# Patient Record
Sex: MALE | Employment: PART TIME | URBAN - METROPOLITAN AREA
[De-identification: names, ages, dates, MRNs, and addresses within clinical notes are randomized per-mention and may not be internally consistent; named-entity substitution may affect disease eponyms.]

---

## 2017-01-19 ENCOUNTER — OFFICE VISIT (OUTPATIENT)
Dept: PSYCHIATRY | Facility: CLINIC | Age: 22
End: 2017-01-19
Attending: PSYCHIATRY & NEUROLOGY
Payer: COMMERCIAL

## 2017-01-19 VITALS — DIASTOLIC BLOOD PRESSURE: 77 MMHG | WEIGHT: 129.2 LBS | SYSTOLIC BLOOD PRESSURE: 128 MMHG | HEART RATE: 78 BPM

## 2017-01-19 DIAGNOSIS — F90.2 ATTENTION DEFICIT HYPERACTIVITY DISORDER (ADHD), COMBINED TYPE: ICD-10-CM

## 2017-01-19 DIAGNOSIS — N39.44 NOCTURNAL ENURESIS: Primary | ICD-10-CM

## 2017-01-19 PROCEDURE — 99212 OFFICE O/P EST SF 10 MIN: CPT | Mod: ZF

## 2017-01-19 RX ORDER — ATOMOXETINE 18 MG/1
18 CAPSULE ORAL DAILY
Qty: 30 CAPSULE | Refills: 2 | Status: SHIPPED | OUTPATIENT
Start: 2017-01-19 | End: 2017-04-17

## 2017-01-19 RX ORDER — IMIPRAMINE HCL 25 MG
25 TABLET ORAL AT BEDTIME
Qty: 30 TABLET | Refills: 1 | Status: SHIPPED | OUTPATIENT
Start: 2017-01-19 | End: 2017-03-24

## 2017-01-19 NOTE — NURSING NOTE
Chief Complaint   Patient presents with     RECHECK     ADHD,Anxiety Disorder unspecified     Reviewed allergies, smoking status, and pharmacy preference  Administered abuse screening questions   Obtained weight, blood pressure and heart rate   Nara Levin LPN

## 2017-01-19 NOTE — PROGRESS NOTES
"VITALS:  See charted vitals.      HISTORY OF PRESENT ILLNESS:  Isak attends the appointment with his parents.  He was on guanfacine for ADHD, but was experiencing some mild benefits and side effects.  Therefore, his mother advised how to taper off the medication.  He is currently taking 1/3 of a 1-mg tablet at bedtime.  Isak reports that at 1.5 mg bid of Guanfacine he saw benefit for his focus, but he was very tired.  The dose was taken up to 2 mg bid, as recommended by his mother.  At that dose, there was no additional benefit, but more side effects.      Isak started taking imipramine 20 mg per day for enuresis.  It has been beneficial in that he has been dry some nights.  He reports having dry mouth.  He thinks since being on imipramine that he has been a bit more irritable and \"miserable.\"  His mother disagrees stating that he has seemed better from a mood perspective since starting the imipramine.  Isak states he sleeps okay and his eating is fine.  He denies suicidal ideation.  He denies worries.  He has been running a lot, up to several miles about 5 times a week.  His mother wonders if he is running too much and is getting fatigued.      Isak and family would like to try something else for ADHD.  We reviewed the gene testing and decided to go with Strattera.      CURRENT MEDICATION:  Guanfacine .33 mg at bedtime.      SIDE EFFECTS:  Had sedation on higher dose of guanfacine.      MENTAL STATUS EXAMINATION:  Isak is a thin, Malag[place] male of average height.  He is cooperative, friendly, and interactive.  His mood has been neutral with some irritability.  Affect is full-range and appropriate.  Thinking is linear and goal-directed.  Associations are intact.  No psychosis.  No suicidal ideation.  Attention, concentration, language, and fund of knowledge are all within normal limits.  No speech or motor abnormalities.  Insight and judgment are fair to good.      DIAGNOSES:   1. ADHD.   2. History of " mood disorder NEC.   3. Nocturnal enuresis.      RECOMMENDATIONS:   1. Risks and benefits of starting Strattera were discussed with Isak and his family.  They agreed to the medication trial.  We will start with Strattera 18 mg per day and titrate up if needed.   2. Increase imipramine to 25 mg per day.   3. Call prn.   4. Return to clinic in 3 months.      TOTAL TIME SPENT:  Face-to-face with Isak and his parents was 35 minutes with greater than 50% of the time spent in counseling and coordination of care.  Counseling included evaluation of recent medication trials, assessment of current symptoms and functioning, and recommendations to initiate Strattera.               geo

## 2017-03-24 DIAGNOSIS — N39.44 NOCTURNAL ENURESIS: ICD-10-CM

## 2017-03-24 RX ORDER — IMIPRAMINE HCL 25 MG
25 TABLET ORAL AT BEDTIME
Qty: 25 TABLET | Refills: 0 | Status: SHIPPED | OUTPATIENT
Start: 2017-03-24 | End: 2017-04-17

## 2017-03-24 NOTE — TELEPHONE ENCOUNTER
Medication Requested: Imipramine  Last Written RX Date: 1/19/17  Last Pharmacy Fill Date: 2/20/17  Last RX Quantity: 30,   # refills: 1    Last Office Visit: 1/19/17  Recommended RTC: 3 mo  Next Scheduled Office Visit: 4/17/17    Since last Visit: # of CX 0 ,# of NOS 0    Last Visit Recommendations for:  Medications: dose increase as ordered  Labs:  Not noted

## 2017-04-17 ENCOUNTER — OFFICE VISIT (OUTPATIENT)
Dept: PSYCHIATRY | Facility: CLINIC | Age: 22
End: 2017-04-17
Attending: PSYCHIATRY & NEUROLOGY
Payer: COMMERCIAL

## 2017-04-17 VITALS — DIASTOLIC BLOOD PRESSURE: 56 MMHG | HEART RATE: 77 BPM | WEIGHT: 132 LBS | SYSTOLIC BLOOD PRESSURE: 115 MMHG

## 2017-04-17 DIAGNOSIS — F90.0 ADHD (ATTENTION DEFICIT HYPERACTIVITY DISORDER), INATTENTIVE TYPE: Primary | ICD-10-CM

## 2017-04-17 DIAGNOSIS — N39.44 NOCTURNAL ENURESIS: ICD-10-CM

## 2017-04-17 PROCEDURE — 99212 OFFICE O/P EST SF 10 MIN: CPT | Mod: ZF

## 2017-04-17 RX ORDER — ATOMOXETINE 40 MG/1
40 CAPSULE ORAL DAILY
Qty: 30 CAPSULE | Refills: 3 | Status: SHIPPED | OUTPATIENT
Start: 2017-04-17 | End: 2017-08-22

## 2017-04-17 RX ORDER — IMIPRAMINE HYDROCHLORIDE 10 MG/1
30 TABLET, FILM COATED ORAL AT BEDTIME
Qty: 90 TABLET | Refills: 3 | Status: SHIPPED | OUTPATIENT
Start: 2017-04-17 | End: 2017-08-22

## 2017-04-17 NOTE — MR AVS SNAPSHOT
After Visit Summary   4/17/2017    Isak Drummond    MRN: [id number]           Patient Information     Date Of Birth          1995        Visit Information        Provider Department      4/17/2017 1:00 PM Mary Sorenson MD [facility]iat[facility]        Today's Diagnoses     ADHD (attention deficit hyperactivity disorder), inattentive type    -  1    Nocturnal enuresis          Care Instructions    Thank you for coming to the PSYCHIATRY CLINIC.    Lab Testing:  **If you had lab testing today and your results are reassuring or normal they will be mailed to you or sent through avolution within 7 days.   **If the lab tests need quick action we will call you with the results.  The phone number we will call with results is # [phone] (home) . If this is not the best number please call our clinic and change the number.    Medication Refills:  If you need any refills please call your pharmacy and they will contact us. Please allow three business for refill processing.   If you need to  your refill at a new pharmacy, please contact the new pharmacy directly. The new pharmacy will help you get your medications transferred.     Scheduling:  If you have any concerns about today's visit or wish to schedule another appointment please call our office during normal business hours [phone] (8-5:00 M-F)    Contact Us:  Please call [phone] during business hours (8-5:00 M-F).  If after clinic hours, or on the weekend, please call  [phone].      MENTAL HEALTH CRISIS NUMBERS:  [facility]:   [facility] - [phone]   [facility] Res[facility] - [phone]   Walk-In Counseling [facility] [phone]   COPE 24/7 [place] Mobile Team for Adults - [[phone]]; Child - [[phone]]     Crisis Connection - [phone]     [facility]:   [facility] - [phone]   Walk-in counseling [facility] - [phone]    Walk-in counseling [facility] - [phone]   Crisis Residence  [facility] - [phone]   Urgent Care Adult Mental Health:   --Drop-in, 24/7 crisis line, and [facility] Mobile Team [[phone]]    CRISIS TEXT LINE: Text [phone] from anywhere, anytime, any crisis 24/7;    OR SEE www.crisistextline.org     Poison Control Center - [phone]    CHILD: [facility] needs assessment team - [phone]     Trans Lifeline - [phone]; or [facility]tee Lifeline - 1-950.457.3412    If you have a medical emergency please call 911or go to the nearest ER.                    _____________________________________________    Again thank you for choosing PSYCHIATRY CLINIC and please let us know how we can best partner with you to improve you and your family's health.  You may be receiving a survey in the mail regarding this appointment. We would love to have your feedback, both positive and negative, so please fill out the survey and return it using the provided envolpe. The survey is done by an external company, so your answers are anonymous.           Follow-ups after your visit        Your next 10 appointments already scheduled     Aug 31, 2017 12:45 PM CDT   Child Anxiety Follow Up with Mary Sorenson MD   Psychiatry Clinic ([facility])    [address]  [address] [facility] [id number] [phone]              Who to contact     Please call your clinic at [phone] to:    Ask questions about your health    Make or cancel appointments    Discuss your medicines    Learn about your test results    Speak to your doctor   If you have compliments or concerns about an experience at your clinic, or if you wish to file a complaint, please contact [facility] Patient Relations at [phone] or email us at [email or web address]         Additional Information About Your Visit        Darshan  Information     [employer] is an electronic gateway that provides easy, online access to your medical records. With [employer], you can request a clinic appointment, read your test results, renew a prescription or communicate with your care team.     To sign up for [employer] visit the website at [email or web address]   You will be asked to enter the access code listed below, as well as some personal information. Please follow the directions to create your username and password.     Your access code is: [email or web address]  Expires: 2017  1:49 PM     Your access code will  in 90 days. If you need help or a new code, please contact your [facility] or call [phone] for assistance.        Care EveryWhere ID     This is your Care EveryWhere ID. This could be used by other organizations to access your [facility] medical records  [id number]         Blood Pressure from Last 3 Encounters:   17 128/77   16 127/64   16 100/55    Weight from Last 3 Encounters:   17 58.6 kg (129 lb 3.2 oz)   16 57.7 kg (127 lb 3.2 oz)   16 56.2 kg (124 lb)              Today, you had the following     No orders found for display         Today's Medication Changes          These changes are accurate as of: 17  1:49 PM.  If you have any questions, ask your nurse or doctor.               These medicines have changed or have updated prescriptions.        Dose/Directions    atomoxetine 40 MG capsule   Commonly known as:  STRATTERA   This may have changed:    - medication strength  - how much to take   Used for:  ADHD (attention deficit hyperactivity disorder), inattentive type        Dose:  40 mg   Take 1 capsule (40 mg) by mouth daily   Quantity:  30 capsule   Refills:  3       imipramine 10 MG tablet   Commonly known as:  TOFRANIL   This may have changed:    - how much to take  - how to take this  - when to take this  - additional instructions  - Another medication with  the same name was removed. Continue taking this medication, and follow the directions you see here.   Used for:  Nocturnal enuresis        Dose:  30 mg   Take 3 tablets (30 mg) by mouth At Bedtime   Quantity:  90 tablet   Refills:  3         Stop taking these medicines if you haven't already. Please contact your care team if you have questions.     guanFACINE 1 MG tablet   Commonly known as:  TENEX                Where to get your medicines      These medications were sent to [id number] - [place], [place] - [address] AT [facility] [address]  [address], [place] [address]    Hours:  24-hours Phone:  [phone]     atomoxetine 40 MG capsule    imipramine 10 MG tablet                Primary Care Provider    Physician No Ref-Primary       No address on file        Thank you!     Thank you for choosing PSYCHIATRY CLINIC  for your care. Our goal is always to provide you with excellent care. Hearing back from our patients is one way we can continue to improve our services. Please take a few minutes to complete the written survey that you may receive in the mail after your visit with us. Thank you!             Your Updated Medication List - Protect others around you: Learn how to safely use, store and throw away your medicines at www.disposemymeds.org.          This list is accurate as of: 4/17/17  1:49 PM.  Always use your most recent med list.                   Brand Name Dispense Instructions for use    atomoxetine 40 MG capsule    STRATTERA    30 capsule    Take 1 capsule (40 mg) by mouth daily       imipramine 10 MG tablet    TOFRANIL    90 tablet    Take 3 tablets (30 mg) by mouth At Bedtime

## 2017-04-17 NOTE — PATIENT INSTRUCTIONS
Thank you for coming to the PSYCHIATRY CLINIC.    Lab Testing:  **If you had lab testing today and your results are reassuring or normal they will be mailed to you or sent through [employer] within 7 days.   **If the lab tests need quick action we will call you with the results.  The phone number we will call with results is # [phone] (home) . If this is not the best number please call our clinic and change the number.    Medication Refills:  If you need any refills please call your pharmacy and they will contact us. Please allow three business for refill processing.   If you need to  your refill at a new pharmacy, please contact the new pharmacy directly. The new pharmacy will help you get your medications transferred.     Scheduling:  If you have any concerns about today's visit or wish to schedule another appointment please call our office during normal business hours [phone] (8-5:00 M-F)    Contact Us:  Please call [phone] during business hours (8-5:00 M-F).  If after clinic hours, or on the weekend, please call  [phone].      MENTAL HEALTH CRISIS NUMBERS:  [facility] [place]:   [facility] - [phone]   Crisis Residence [facility] Residence - [phone]   Walk-In Counseling [facility] [phone]   COPE 24/7 [place] Mobile Team for Adults - [[phone]]; Child - [[phone]]     Crisis Connection - [phone]     [facility] - [phone]   Walk-in counseling [facility] - [phone]   Walk-in counseling [facility] - [phone]   Crisis Residence [facility] - [phone]   Urgent Care Adult Mental Health:   --Drop-in, 24/7 crisis line, and [facility] Mobile Team [[phone]]    CRISIS TEXT LINE: Text [phone] from anywhere, anytime, any crisis 24/7;    OR SEE www.crisistextline.org     Poison Control Center - [phone]    CHILD: [facility] needs  assessment team - [phone]     [facility] - [phone]; or [facility] - [phone]    If you have a medical emergency please call 911or go to the nearest ER.                    _____________________________________________    Again thank you for choosing PSYCHIATRY CLINIC and please let us know how we can best partner with you to improve you and your family's health.  You may be receiving a survey in the mail regarding this appointment. We would love to have your feedback, both positive and negative, so please fill out the survey and return it using the provided envolpe. The survey is done by an external company, so your answers are anonymous.

## 2017-04-17 NOTE — PROGRESS NOTES
VITALS:  See charted vitals.      HISTORY OF PRESENT ILLNESS:  Isak attends the appointment with his mother.  He is at [employer] taking accounting and business classes.  It has been a challenging semester due to the  Heavy work load.  At the last appointment he started Strattera 18 mg per day.  Isak has noticed some benefit.  It has allowed him to focus and be less distracted.  The improvements are subtle.  He denies any side effects.      His mood has been pretty good.  He is exercising a lot, including running about 20 miles a week and he started swimming.  He has become quite strong.  His mood has been mostly good and Isak has been eating and sleeping pretty well.      With Imipramine 25 mg per day, enuresis has improved to 1-2 times per week.    Before it was 4-5 times per week.  As a side effect of Imipramine Isak is reporting a dry mouth.  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  Thus, his family would like to try a higher dose of Imipramine to see if it will further improve the enuresis.      CURRENT MEDICATIONS:   1.  Strattera 18 mg per day.   2.  Imipramine 25 mg at bedtime.      SIDE EFFECTS:  Dry mouth from Imipramine.      MENTAL STATUS EXAMINATION:  Isak is casually dressed and groomed.  He maintains good eye contact.  He answers questions intelligently.  His mood is neutral.  Affect is full range and appropriate.  Thinking is linear and goal-directed, associations are intact.  There is no psychosis.  There is no evidence of suicidal ideation.  Recent and remote memory, attention, concentration, language and fund of knowledge are all within normal limits.  No speech or motor abnormalities.  Insight and judgment are good.      DIAGNOSES:   1.  ADHD, predominantly inattentive.   2.  History of mood disorder NOS.   3.  Nocturnal enuresis.      RECOMMENDATIONS:   1.  Increase Strattera to 40 mg per day which is still a low dose for him.  We reviewed the [employer] testing  which indicates that Strattera should be metabolized normally by this individual.   2.  Increase Imipramine from 25 to 30 mg at bedtime.   3.  Call with progress report if side effects are noted or a change in dose is requested.   4.  Return to clinic in August.      TOTAL TIME SPENT:  Face-to-face with Isak and his mother was 35 minutes with greater than 50% of the time spent in counseling and coordination of care.  Counseling included assessment of symptoms and functioning, discussion of adjustment to the academic challenges and plans to do an internship this summer.

## 2017-04-17 NOTE — NURSING NOTE
Chief Complaint   Patient presents with     RECHECK     ADHD     Reviewed allergies, smoking status, and pharmacy preference  Administered abuse screening questions-done 1/19/17   Obtained weight-132.0 lb, blood pressure-115/56 and heart rate-77   Nara Levin LPN

## 2017-08-22 DIAGNOSIS — N39.44 NOCTURNAL ENURESIS: ICD-10-CM

## 2017-08-22 DIAGNOSIS — F90.0 ADHD (ATTENTION DEFICIT HYPERACTIVITY DISORDER), INATTENTIVE TYPE: ICD-10-CM

## 2017-08-22 RX ORDER — IMIPRAMINE HYDROCHLORIDE 10 MG/1
30 TABLET, FILM COATED ORAL AT BEDTIME
Qty: 90 TABLET | Refills: 0 | Status: SHIPPED | OUTPATIENT
Start: 2017-08-22 | End: 2017-08-31

## 2017-08-22 RX ORDER — ATOMOXETINE 40 MG/1
40 CAPSULE ORAL DAILY
Qty: 30 CAPSULE | Refills: 0 | Status: SHIPPED | OUTPATIENT
Start: 2017-08-22 | End: 2017-08-31

## 2017-08-22 NOTE — TELEPHONE ENCOUNTER
Medication requested: Strattera and Tofranil  Last refilled: 7-16-17  Qty: 30 day supply      Last seen: 4-17-17  RTC: august  Cancel: 0  No-show: 0  Next appt: 8-31-17    Refill decision: Refilled for 30 days per protocol.      Kathleen M Doege RN

## 2017-08-31 ENCOUNTER — OFFICE VISIT (OUTPATIENT)
Dept: PSYCHIATRY | Facility: CLINIC | Age: 22
End: 2017-08-31
Attending: PSYCHIATRY & NEUROLOGY
Payer: COMMERCIAL

## 2017-08-31 VITALS — WEIGHT: 129.4 LBS | DIASTOLIC BLOOD PRESSURE: 81 MMHG | HEART RATE: 74 BPM | SYSTOLIC BLOOD PRESSURE: 123 MMHG

## 2017-08-31 DIAGNOSIS — N39.44 NOCTURNAL ENURESIS: ICD-10-CM

## 2017-08-31 DIAGNOSIS — F90.2 ATTENTION DEFICIT HYPERACTIVITY DISORDER (ADHD), COMBINED TYPE: Primary | ICD-10-CM

## 2017-08-31 DIAGNOSIS — F90.0 ADHD (ATTENTION DEFICIT HYPERACTIVITY DISORDER), INATTENTIVE TYPE: ICD-10-CM

## 2017-08-31 PROCEDURE — 99212 OFFICE O/P EST SF 10 MIN: CPT | Mod: ZF

## 2017-08-31 RX ORDER — ATOMOXETINE 60 MG/1
60 CAPSULE ORAL DAILY
Qty: 30 CAPSULE | Refills: 2 | Status: SHIPPED | OUTPATIENT
Start: 2017-08-31 | End: 2017-11-30

## 2017-08-31 RX ORDER — IMIPRAMINE HYDROCHLORIDE 10 MG/1
30 TABLET, FILM COATED ORAL AT BEDTIME
Qty: 90 TABLET | Refills: 1 | Status: SHIPPED | OUTPATIENT
Start: 2017-09-22 | End: 2017-11-20

## 2017-08-31 NOTE — PROGRESS NOTES
Vital signs: See charted vitals    History of present illness: Isak attends the appointment with his parents. At the last appointment his Strattera was increased to the current dose of 40 mg per day. Patient states that he has found benefit for his ADHD symptoms with Strattera. He is able to focus better throughout the day. There have been no side effects. 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 It was hard work but he learned a lot and it went fine.    He will have a demanding semester including taking a finance class and computer science. His father notes that Isak does better in the real world then in the academic setting. I suggested an increase of Strattera because his dose is low and he is seeing benefit. This may facilitate even better focus and concentration. The patient and his parents agree with this recommendation.    At the last visit imipramine was increased was from 25 mg per day to 30 mg per day. The patient continues to have nocturnal enuresis about 1-2 times per week. At this time he is not interested in increasing the imipramine further. This can be revisited at a later appointment.    His mood has been fine. There is no insomnia. Appetite is very good. Energy is normal. There is no suicidal ideation. He runs 3-5 times per week. Concentration is improved. He is optimistic about the upcoming semester. However he does have worries about meeting deadlines.    CURRENT MEDICATIONS:   1.  Strattera 40 mg per day.   2.  Imipramine 30 mg at bedtime.       SIDE EFFECTS:  Dry mouth from Imipramine.       MENTAL STATUS EXAMINATION:  Isak is casually dressed and groomed.  He maintains good eye contact.  He answers questions intelligently.  His mood is neutral.  Affect is full range and appropriate.  Thinking is linear and goal-directed, associations are intact.  There is no psychosis.  There is no suicidal ideation.  Recent and remote memory, attention, concentration, language and fund  of knowledge are all within normal limits.  No speech or motor abnormalities.  Insight and judgment are good.       DIAGNOSES:   1.  ADHD, predominantly inattentive.   2.  History of mood disorder NOS.   3.  Nocturnal enuresis.       RECOMMENDATIONS:   1.  Increase Strattera to 60 mg per day which is close to a target dose for him.  Previous review of the GenePsych testing indicated that Strattera should be metabolized normally by this individual.   2.  Continue Imipramine 30 mg at bedtime.   3.  Call with progress report if side effects are noted or a change in dose is requested.   4.  Return to clinic in 6 months and earlier if needed.       TOTAL TIME SPENT:  Face-to-face with Isak and his mother was 30 minutes with greater than 50% of the time spent in counseling and coordination of care.  Counseling included assessment of symptoms and functioning, discussion of nternship this summer and upcoming semester at [employer].

## 2017-08-31 NOTE — MR AVS SNAPSHOT
After Visit Summary   2017    Isak Drummond    MRN: [id number]           Patient Information     Date Of Birth          1995        Visit Information        Provider Department      2017 12:45 PM Mary Sorenson MD [facility]        Today's Diagnoses     Attention deficit hyperactivity disorder (ADHD), combined type    -  1    ADHD (attention deficit hyperactivity disorder), inattentive type        Nocturnal enuresis           Follow-ups after your visit        Follow-up notes from your care team     Return in about 6 months (around 2018).      Who to contact     Please call your clinic at [phone] to:    Ask questions about your health    Make or cancel appointments    Discuss your medicines    Learn about your test results    Speak to your doctor   If you have compliments or concerns about an experience at your clinic, or if you wish to file a complaint, please contact [facility] Patient Relations at [phone] or email us at [email or web address]         Additional Information About Your Visit        MyChart Information     [employer] is an electronic gateway that provides easy, online access to your medical records. With [employer], you can request a clinic appointment, read your test results, renew a prescription or communicate with your care team.     To sign up for [employer] visit the website at [email or web address]   You will be asked to enter the access code listed below, as well as some personal information. Please follow the directions to create your username and password.     Your access code is: [email or web address]  Expires: 2017  2:53 PM     Your access code will  in 90 days. If you need help or a new code, please contact your [facility] or call [phone] for assistance.        Care EveryWhere ID     This is your Care EveryWhere ID. This could be used by other organizations to  access your [facility] medical records  [id number]        Your Vitals Were     Pulse                   74            Blood Pressure from Last 3 Encounters:   08/31/17 123/81   04/17/17 115/56   01/19/17 128/77    Weight from Last 3 Encounters:   08/31/17 58.7 kg (129 lb 6.4 oz)   04/17/17 59.9 kg (132 lb)   01/19/17 58.6 kg (129 lb 3.2 oz)              Today, you had the following     No orders found for display         Today's Medication Changes          These changes are accurate as of: 8/31/17  2:53 PM.  If you have any questions, ask your nurse or doctor.               These medicines have changed or have updated prescriptions.        Dose/Directions    atomoxetine 60 MG capsule   Commonly known as:  STRATTERA   This may have changed:    - medication strength  - how much to take   Used for:  ADHD (attention deficit hyperactivity disorder), inattentive type   Changed by:  Mary Sorenson MD        Dose:  60 mg   Take 1 capsule (60 mg) by mouth daily   Quantity:  30 capsule   Refills:  2            Where to get your medicines      These medications were sent to [facility] - [place], [place] - [address] AT [facility] [address]  [address], [facility] [address]    Hours:  24-hours Phone:  [phone]     atomoxetine 60 MG capsule    imipramine 10 MG tablet                Primary Care Provider    Physician No Ref-Primary       No address on file        Equal Access to Services     DAO BURT AH: Hadii fany sherwood hadjackio Sodell, waaxda luqadaha, qaybta kaalmada jose luisyada, evelio callahan. So [facility] [phone].    ATENCIÓN: Si habla español, tiene a busby disposición servicios gratuitos de asistencia lingüística. Dusty al [phone].    We comply with applicable federal civil rights laws and [place] laws. We do not discriminate on the basis of race, color, national origin, age, disability sex, sexual orientation or gender identity.            Thank you!     Thank you  for choosing [facility]  for your care. Our goal is always to provide you with excellent care. Hearing back from our patients is one way we can continue to improve our services. Please take a few minutes to complete the written survey that you may receive in the mail after your visit with us. Thank you!             Your Updated Medication List - Protect others around you: Learn how to safely use, store and throw away your medicines at www.disposemymeds.org.          This list is accurate as of: 8/31/17  2:53 PM.  Always use your most recent med list.                   Brand Name Dispense Instructions for use Diagnosis    atomoxetine 60 MG capsule    STRATTERA    30 capsule    Take 1 capsule (60 mg) by mouth daily    ADHD (attention deficit hyperactivity disorder), inattentive type       imipramine 10 MG tablet   Start taking on:  9/22/2017    TOFRANIL    90 tablet    Take 3 tablets (30 mg) by mouth At Bedtime    Nocturnal enuresis

## 2017-11-20 DIAGNOSIS — N39.44 NOCTURNAL ENURESIS: ICD-10-CM

## 2017-11-20 RX ORDER — IMIPRAMINE HYDROCHLORIDE 10 MG/1
30 TABLET, FILM COATED ORAL AT BEDTIME
Qty: 90 TABLET | Refills: 0 | Status: SHIPPED | OUTPATIENT
Start: 2017-11-20 | End: 2017-12-04

## 2017-11-20 NOTE — TELEPHONE ENCOUNTER
Medication requested: Imipramine 10 mg tabs  Last refilled: 10-20-17  Qty: 90      Last seen: 8-31-17  RTC: 6 mo  Cancel: 0  No-show: 0  Next appt: none    Refill decision: 30 day randal refill due to no scheduled appointment sent to the pharmacy - including instructions for patient to call the clinic and schedule an appointment.  Scheduling notified.      Kathleen M Doege RN

## 2017-11-30 DIAGNOSIS — F90.0 ADHD (ATTENTION DEFICIT HYPERACTIVITY DISORDER), INATTENTIVE TYPE: ICD-10-CM

## 2017-11-30 RX ORDER — ATOMOXETINE 60 MG/1
60 CAPSULE ORAL DAILY
Qty: 30 CAPSULE | Refills: 0 | Status: SHIPPED | OUTPATIENT
Start: 2017-11-30 | End: 2017-12-04

## 2017-11-30 NOTE — TELEPHONE ENCOUNTER
Medication requested: Atomoxetine 60 mg tabs  Last refilled: 10-27-17  Qty: 30      Last seen: 8-31-17  RTC: 6 mo  Cancel: 0  No-show: 0  Next appt: none    Refill decision: 30 day randal refill sent to the pharmacy - including instructions for patient to call the clinic and schedule an appointment.  Scheduling has been notified to contact the pt for appointment.      Kathleen M Doege RN

## 2017-12-01 ENCOUNTER — TELEPHONE (OUTPATIENT)
Dept: PSYCHIATRY | Facility: CLINIC | Age: 22
End: 2017-12-01

## 2017-12-01 DIAGNOSIS — N39.44 NOCTURNAL ENURESIS: ICD-10-CM

## 2017-12-01 DIAGNOSIS — F90.0 ADHD (ATTENTION DEFICIT HYPERACTIVITY DISORDER), INATTENTIVE TYPE: ICD-10-CM

## 2017-12-01 NOTE — TELEPHONE ENCOUNTER
----- Message from Randell Dumont sent at 12/1/2017 12:55 PM CST -----  Regarding: Conversation w/ pt's mother  Contact: [phone]  I called pt's number to schedule for an appointment in February (per med refill team).  She said that the patient does not need to come back until spring, so they do not want to schedule an appointment at this time.  I explained that when we get messages to schedule from the refill team that means that they would like the patient to have a future appointment scheduled when filling prescriptions.  She was insistent that they did not need to schedule but requested that I send you a message regarding pt's Strattera, and said that she needs to pick it up tonight.

## 2017-12-01 NOTE — TELEPHONE ENCOUNTER
Last seen: 8/31/17  RTC: 6 months    Will notify Dr. Sorenson of msg from pt's mother to see if she would be agreeable to pt returning to clinic in the spring.  Refill of Strattera was approved by refill team yesterday for 30 d/s.

## 2017-12-04 RX ORDER — ATOMOXETINE 60 MG/1
60 CAPSULE ORAL DAILY
Qty: 30 CAPSULE | Refills: 3 | Status: SHIPPED | OUTPATIENT
Start: 2017-12-04 | End: 2018-03-28

## 2017-12-04 RX ORDER — IMIPRAMINE HYDROCHLORIDE 10 MG/1
30 TABLET, FILM COATED ORAL AT BEDTIME
Qty: 90 TABLET | Refills: 3 | Status: SHIPPED | OUTPATIENT
Start: 2017-12-04 | End: 2018-03-28

## 2017-12-04 NOTE — TELEPHONE ENCOUNTER
-Call placed to mom Sema (consent to communicate on file)  -Reports pt is doing well and assisted in scheduling an appt for 3/28/18 at 1:00 pm while pt is on spring break  -Shared that writer would send enough refills to pharmacy to get pt through until appt in March  -Mom confirms she picked up refills of both meds in late Nov.

## 2017-12-04 NOTE — TELEPHONE ENCOUNTER
Message  Received: 3 days ago       Mary Sorenson MD Blake, Katherine J RN       Caller: Unspecified (3 days ago,  2:29 PM)                     Yes.  GB

## 2018-03-26 ENCOUNTER — THERAPY VISIT (OUTPATIENT)
Dept: CHIROPRACTIC MEDICINE | Facility: CLINIC | Age: 23
End: 2018-03-26
Payer: COMMERCIAL

## 2018-03-26 DIAGNOSIS — M99.05 SOMATIC DYSFUNCTION OF PELVIS REGION: Primary | ICD-10-CM

## 2018-03-26 DIAGNOSIS — M25.572 PAIN IN JOINT INVOLVING ANKLE AND FOOT, LEFT: ICD-10-CM

## 2018-03-26 DIAGNOSIS — M99.06 SOMATIC DYSFUNCTION OF LOWER EXTREMITIES: ICD-10-CM

## 2018-03-26 DIAGNOSIS — M79.10 MYALGIA: ICD-10-CM

## 2018-03-26 DIAGNOSIS — M79.662 PAIN IN SHIN, LEFT: ICD-10-CM

## 2018-03-26 PROCEDURE — 99202 OFFICE O/P NEW SF 15 MIN: CPT | Performed by: CHIROPRACTOR

## 2018-03-28 ENCOUNTER — OFFICE VISIT (OUTPATIENT)
Dept: PSYCHIATRY | Facility: CLINIC | Age: 23
End: 2018-03-28
Attending: PSYCHIATRY & NEUROLOGY
Payer: COMMERCIAL

## 2018-03-28 VITALS — SYSTOLIC BLOOD PRESSURE: 113 MMHG | HEART RATE: 79 BPM | DIASTOLIC BLOOD PRESSURE: 71 MMHG | WEIGHT: 127.4 LBS

## 2018-03-28 DIAGNOSIS — N39.44 NOCTURNAL ENURESIS: ICD-10-CM

## 2018-03-28 DIAGNOSIS — F90.0 ADHD (ATTENTION DEFICIT HYPERACTIVITY DISORDER), INATTENTIVE TYPE: ICD-10-CM

## 2018-03-28 PROCEDURE — G0463 HOSPITAL OUTPT CLINIC VISIT: HCPCS | Mod: ZF

## 2018-03-28 RX ORDER — ATOMOXETINE 60 MG/1
60 CAPSULE ORAL DAILY
Qty: 30 CAPSULE | Refills: 5 | Status: SHIPPED | OUTPATIENT
Start: 2018-03-28 | End: 2018-09-25

## 2018-03-28 RX ORDER — IMIPRAMINE HYDROCHLORIDE 10 MG/1
20 TABLET, FILM COATED ORAL AT BEDTIME
Qty: 60 TABLET | Refills: 5 | Status: SHIPPED | OUTPATIENT
Start: 2018-03-28 | End: 2018-10-04

## 2018-03-28 ASSESSMENT — PAIN SCALES - GENERAL: PAINLEVEL: NO PAIN (0)

## 2018-03-28 NOTE — PROGRESS NOTES
Vital signs: See charted vitals    History of present illness: Isak attends the appointment with his parents. At the last appointment his Strattera was increased to the current dose of 60 mg per day. Patient states that he has found benefit for his ADHD symptoms with Strattera. He is able to focus better throughout the day. There have been no side effects from strattera. He will be graduating from [employer] in May and beginning a job search.    On imipramine 30 mg, he was having dry mouth and disrupted sleep.  He reduced imipramine to 20 mg and SEs went away.  He continued to have  nocturnal enuresis about 1-2 times per week on 20 mg (same as 30 mg).   His mood has been fine. There is no insomnia. Appetite is very good. Energy is normal. There is no evidence of suicidal ideation. He runs less due to orthopedic injury. Concentration is improved.     CURRENT MEDICATIONS:   1.  Strattera 60 mg per day.   2.  Imipramine 20 mg at bedtime.       SIDE EFFECTS:  Dry mouth and sleep disruption from Imipramine.     MED ROS:  Has had several URIs      MENTAL STATUS EXAMINATION:  Isak is casually dressed and groomed.  He maintains good eye contact.  He answers questions intelligently.  His mood is neutral.  Affect is full range and appropriate.  Thinking is linear and goal-directed, associations are intact.  There is no psychosis.  There is no evidence of suicidal ideation.  Recent and remote memory, attention, concentration, language and fund of knowledge are all within normal limits.  No speech or motor abnormalities.  Insight and judgment are good.       DIAGNOSES:   1.  ADHD, predominantly inattentive.   2.  History of mood disorder NOS.   3.  Nocturnal enuresis.       RECOMMENDATIONS:   1.  Continue meds as listed above  2.  Call with progress report if side effects are noted or a change in dose is requested.   2.  Return to clinic in 6 months and earlier if needed.       TOTAL TIME SPENT:  Face-to-face with Isak and  his parents was 25 minutes with greater than 50% of the time spent in counseling and coordination of care.  Counseling included assessment of symptoms and functioning, discussion of upcoming graduation and job search.

## 2018-03-28 NOTE — MR AVS SNAPSHOT
After Visit Summary   3/28/2018    Isak Drummond    MRN: [id number]           Patient Information     Date Of Birth          1995        Visit Information        Provider Department      3/28/2018 1:00 PM Mary Sorenson MD Psychiatry Clinic        Today's Diagnoses     ADHD (attention deficit hyperactivity disorder), inattentive type        Nocturnal enuresis           Follow-ups after your visit        Your next 10 appointments already scheduled     Apr 02, 2018  3:15 PM CDT   [facility] Chiropractor with Tomi Madsen DC   [facility] [place] [facility] ([facility] [place])    [address] [place] [address]  [address]  [place] [address]   [phone]            Apr 10, 2018  9:30 AM CDT   AM[facility] Chiropractor with Tomi Madsen DC   [facility] [place] [facility] ([facility] [place]  )    [address] [place] [address]. [address]  [place] [address]   [phone]            Apr 17, 2018  9:30 AM CDT   AM[facility] Chiropractor with Tomi Madsen DC   [facility] [place] [facility] ([facility] [place]  )    [address] [place] [address]. [address]  [place] [address]   [phone]            Apr 24, 2018  9:30 AM CDT   AMY Chiropractor with Tomi Madsen DC   [facility] [place] [facility] ([facility] [place]  )    [address] [place] [address]. [address]  [place] [address]   [phone]              Who to contact     Please call your clinic at [phone] to:    Ask questions about your health    Make or cancel appointments    Discuss your medicines    Learn about your test results    Speak to your doctor            Additional Information About Your Visit        One[employer] Information     [employer] is an electronic gateway that provides easy, online access to your medical records. With [employer], you can request a clinic appointment, read your test results, renew a prescription or communicate with your care team.     To sign up for [email or web address] visit the website at [email or web address]   You will be asked to enter the access code listed below, as well as some  personal information. Please follow the directions to create your username and password.     Your access code is: [email or web address]  Expires: 2018  6:30 PM     Your access code will  in 90 days. If you need help or a new code, please contact your [facility] or call [phone] for assistance.        Care EveryWhere ID     This is your Care EveryWhere ID. This could be used by other organizations to access your [facility] medical records  [id number]        Your Vitals Were     Pulse                   79            Blood Pressure from Last 3 Encounters:   18 113/71   17 123/81   17 115/56    Weight from Last 3 Encounters:   18 57.8 kg (127 lb 6.4 oz)   17 58.7 kg (129 lb 6.4 oz)   17 59.9 kg (132 lb)              Today, you had the following     No orders found for display         Today's Medication Changes          These changes are accurate as of 3/28/18  6:30 PM.  If you have any questions, ask your nurse or doctor.               These medicines have changed or have updated prescriptions.        Dose/Directions    imipramine 10 MG tablet   Commonly known as:  TOFRANIL   This may have changed:  how much to take   Used for:  Nocturnal enuresis   Changed by:  Mary Sorenson MD        Dose:  20 mg   Take 2 tablets (20 mg) by mouth At Bedtime   Quantity:  60 tablet   Refills:  5            Where to get your medicines      [facility] [address] [place] [address], [place] - [address] AT [facility] [address]  [address], [place] [address]     Phone:  [phone]     atomoxetine 60 MG capsule    imipramine 10 MG tablet                Primary Care Provider Fax #    Physician No Ref-Primary [phone]       No address on file        Equal Access to Services     DAO BURT : Bina Kearney, waross champagne, qaybta kaalmavolodymyr elizabeth, evelio callahan. So [facility]  [phone].    ATENCIÓN: Si scarlet metzger, tiene a busby disposición servicios gratuitos de asistencia lingüística. Dusty duran [phone].    We comply with applicable federal civil rights laws and [place] laws. We do not discriminate on the basis of race, color, national origin, age, disability, sex, sexual orientation, or gender identity.            Thank you!     Thank you for choosing [facility]IN[facility]  for your care. Our goal is always to provide you with excellent care. Hearing back from our patients is one way we can continue to improve our services. Please take a few minutes to complete the written survey that you may receive in the mail after your visit with us. Thank you!             Your Updated Medication List - Protect others around you: Learn how to safely use, store and throw away your medicines at www.disposemymeds.org.          This list is accurate as of 3/28/18  6:30 PM.  Always use your most recent med list.                   Brand Name Dispense Instructions for use Diagnosis    atomoxetine 60 MG capsule    STRATTERA    30 capsule    Take 1 capsule (60 mg) by mouth daily    ADHD (attention deficit hyperactivity disorder), inattentive type       imipramine 10 MG tablet    TOFRANIL    60 tablet    Take 2 tablets (20 mg) by mouth At Bedtime    Nocturnal enuresis

## 2018-03-28 NOTE — NURSING NOTE
Chief Complaint   Patient presents with     Recheck Medication     ADHD     Reviewed allergies, smoking status, pharmacy preference, and medications  Obtained weight, blood pressure and heart rate

## 2018-04-01 PROBLEM — M79.662: Status: ACTIVE | Noted: 2018-04-01

## 2018-04-01 PROBLEM — M79.10 MYALGIA: Status: ACTIVE | Noted: 2018-04-01

## 2018-04-01 PROBLEM — M99.05 SOMATIC DYSFUNCTION OF PELVIS REGION: Status: ACTIVE | Noted: 2018-04-01

## 2018-04-01 PROBLEM — M25.572 PAIN IN JOINT INVOLVING ANKLE AND FOOT, LEFT: Status: ACTIVE | Noted: 2018-04-01

## 2018-04-01 PROBLEM — M99.06 SOMATIC DYSFUNCTION OF LOWER EXTREMITIES: Status: ACTIVE | Noted: 2018-04-01

## 2018-04-02 ENCOUNTER — THERAPY VISIT (OUTPATIENT)
Dept: CHIROPRACTIC MEDICINE | Facility: CLINIC | Age: 23
End: 2018-04-02
Payer: COMMERCIAL

## 2018-04-02 DIAGNOSIS — M99.05 SOMATIC DYSFUNCTION OF PELVIS REGION: Primary | ICD-10-CM

## 2018-04-02 DIAGNOSIS — M25.572 PAIN IN JOINT INVOLVING ANKLE AND FOOT, LEFT: ICD-10-CM

## 2018-04-02 DIAGNOSIS — M79.10 MYALGIA: ICD-10-CM

## 2018-04-02 DIAGNOSIS — M79.662 PAIN IN SHIN, LEFT: ICD-10-CM

## 2018-04-02 DIAGNOSIS — M99.06 SOMATIC DYSFUNCTION OF LOWER EXTREMITIES: ICD-10-CM

## 2018-04-02 PROCEDURE — 97110 THERAPEUTIC EXERCISES: CPT | Performed by: CHIROPRACTOR

## 2018-04-02 PROCEDURE — 98940 CHIROPRACT MANJ 1-2 REGIONS: CPT | Mod: AT | Performed by: CHIROPRACTOR

## 2018-04-02 NOTE — PROGRESS NOTES
[facility]  Mar 26, 2018    Subjective:  Isak Drummond   22 year old   male    CC: left leg pain, runner, referral from Dr. Le and Dr. Terrazas   Medications reviewed: currently on medication for ADHD  Visit: 1/6  Goal: run, stand for 30 minutes without pain   Location: L lateral lower leg  CARMENCITA: Noted caught ankle and tripped while running September 2017 and has been sore since   Pain: varies but 6/10 on average  Previous History: Denies any significant injuries in past  Progression: not changing   Quality: ache and tightness  Radiation: Denies   Pain is worse with: running, standing for long periods  Pain is better with: rest, exercises   Timing: frequent pain   Under care: is currently working with DC and PT  Imaging: had MRI done and according to mother unremarkable   Social: alert, oriented, and active. Unable to run and wants to get back running. Mother is very involved with care and did most of talking for her son    Comes in saying exam went well and was not sore. Has not ran yet. Has not seen PT again. Most pain over lateral ankle. Notes no changes in health history.       Objective:  Inspection:  No SDD  No Scars  Normal Gait    Palpation:  No specific pain  Palpable soreness over left latera lower leg  Myofascitis 2/4 noted over L IT, L hip ER's, L peroneals     ROM:  Lumbar flexion   90/90  Lumbar extension  30/30, lower back pain  Right Hip IR  31/45  Left Hip IR  40/45  Right Hip ER  53/60  Left  hip ER  60/60  Ankle DF limited talar neutral B  Knee flexion and extension full and pain free    MMT:  Left glute med 4/5 with no pain  Right glute med 4/5 with no pain  Left TFL 5/5 with no pain  Right TFL 5/5 with no pain  Left piriformis 5/5 with no pain  Right piriformis 5/5 with no pain  Left ankle eversion 5/5 with mild left lower leg discomfort   Hamstrings 5/5 with no pain     MET:  L short leg    Squat:  Shift to  L  Foot flare to L  B knee genu valgum     Lunge:  B knee genu  valgum    NAL:  Restricted SI on L  Restricted cuboid on L    Ortho:  SLR (-), valgus, varus all (-)    Assessment:  NAL with associated myofascitis and weakness  Lower leg pain, shin pain, possible peroneal tendinosis     Plan:  Patient tolerated treatment well today  Treatment Time: 45 minutes  60633 manipulation 1-2 segments: MET, Hip LAD  57398 manipulation: Manual extremity  89546 Manual therapy: (ART, Graston, Strain Counter Strain, Fascial Manipulation, Cupping) performed over area of L hip ER's, L TFL, L peroneals   87264 therapeutic exercise (30 minutes): ankle mulligan, kneeling nerve floss  Strapping: hip IR on L  Taping:  Next visit: 1 week  Other: today worked lateral fascial line       Tomi Madsen DC, MEd, ATC

## 2018-04-02 NOTE — PROGRESS NOTES
[facility]  Mar 26, 2018    Subjective:  Isak Drummond   22 year old   male    CC: left leg pain, runner, referral from Dr. Le and Dr. Terrazas   Medications reviewed: currently on medication for ADHD  Visit: 1/6  Goal: run, stand for 30 minutes without pain   Location: L lateral lower leg  CARMENCITA: Noted caught ankle and tripped while running September 2017 and has been sore since   Pain: varies but 6/10 on average  Previous History: Denies any significant injuries in past  Progression: not changing   Quality: ache and tightness  Radiation: Denies   Pain is worse with: running, standing for long periods  Pain is better with: rest, exercises   Timing: frequent pain   Under care: is currently working with DC and PT  Imaging: had MRI done and according to mother unremarkable   Social: alert, oriented, and active. Unable to run and wants to get back running. Mother is very involved with care and did most of talking for her son      Objective:  Inspection:  No SDD  No Scars  Normal Gait    Palpation:  No specific pain  Palpable soreness over left latera lower leg  Myofascitis 2/4 noted over L IT, L hip ER's, L peroneals     ROM:  Lumbar flexion   90/90  Lumbar extension  30/30, lower back pain  Right Hip IR  31/45  Left Hip IR  40/45  Right Hip ER  53/60  Left  hip ER  60/60  Ankle DF limited talar neutral B  Knee flexion and extension full and pain free    MMT:  Left glute med 4/5 with no pain  Right glute med 4/5 with no pain  Left TFL 5/5 with no pain  Right TFL 5/5 with no pain  Left piriformis 5/5 with no pain  Right piriformis 5/5 with no pain  Left ankle eversion 5/5 with mild left lower leg discomfort   Hamstrings 5/5 with no pain     MET:  L short leg    Squat:  Shift to  L  Foot flare to L  B knee genu valgum     Lunge:  B knee genu valgum    NAL:  Restricted SI on L  Restricted cuboid on L    Neuro:  Able to toe walk and heel walk  L4-S1 light touch WNL    Ortho:  SLR (-), valgus, varus  all (-)    Assessment:  NAL with associated myofascitis and weakness  Lower leg pain, shin pain, possible peroneal tendinosis     Plan:   Decrease pain 50%    Restore symmetric hip IR   Restore symmetric hip ER   Strengthen hip stabilizers to 5/5 B   Restore pelvic leveling   Restore segmental motion   Functional goals in history    Patient was given detailed history, review of symptoms, examination, functional examination, and report of findings. After this patient was treated with chiropractic adjustments, manual therapy, and therapeutic exercise. Patient tolerated treatment well. Patients treatment plan with be 2 times per week for 2 weeks followed by 1 time per week for 2 weeks. Following treatment plan a follow up exam will be done to make sure patient is improving. Treatment frequency will degrease as patients subjective complaints improve as well as objective findings. Prognosis for care is good based on fact that patient is active and is willing to take active approach in care.     Patient tolerated treatment well today  Treatment Time: 45 minutes  28699 manipulation 1-2 segments: MET, Hip LAD  89903 manipulation: Manual extremity  50238 Manual therapy: (ART, Graston, Strain Counter Strain, Fascial Manipulation, Cupping) performed over area of L hip ER's, L TFL, L peroneals   23739 therapeutic exercise (30 minutes): ankle mulligan, eccentric calf, review of current PT exercises   Strapping: hip IR on L  Taping:  Next visit: 1 week  Other:      Tomi Madsen DC, MEd, ATC

## 2018-04-10 ENCOUNTER — THERAPY VISIT (OUTPATIENT)
Dept: CHIROPRACTIC MEDICINE | Facility: CLINIC | Age: 23
End: 2018-04-10
Payer: COMMERCIAL

## 2018-04-10 DIAGNOSIS — M79.10 MYALGIA: ICD-10-CM

## 2018-04-10 DIAGNOSIS — M99.05 SOMATIC DYSFUNCTION OF PELVIS REGION: Primary | ICD-10-CM

## 2018-04-10 DIAGNOSIS — M25.572 PAIN IN JOINT INVOLVING ANKLE AND FOOT, LEFT: ICD-10-CM

## 2018-04-10 DIAGNOSIS — M79.662 PAIN IN SHIN, LEFT: ICD-10-CM

## 2018-04-10 DIAGNOSIS — M99.06 SOMATIC DYSFUNCTION OF LOWER EXTREMITIES: ICD-10-CM

## 2018-04-10 PROCEDURE — 97110 THERAPEUTIC EXERCISES: CPT | Performed by: CHIROPRACTOR

## 2018-04-10 PROCEDURE — 98940 CHIROPRACT MANJ 1-2 REGIONS: CPT | Mod: AT | Performed by: CHIROPRACTOR

## 2018-04-12 NOTE — PROGRESS NOTES
[facility]  Mar 26, 2018    Subjective:  Isak Drummond   22 year old   male    CC: left leg pain, runner, referral from Dr. Le and Dr. Terrazas   Medications reviewed: currently on medication for ADHD  Visit: 2/6  Goal: run, stand for 30 minutes without pain   Location: L lateral lower leg  CARMENCITA: Noted caught ankle and tripped while running September 2017 and has been sore since   Pain: varies but 6/10 on average  Previous History: Denies any significant injuries in past  Progression: not changing   Quality: ache and tightness  Radiation: Denies   Pain is worse with: running, standing for long periods  Pain is better with: rest, exercises   Timing: frequent pain   Under care: is currently working with DC and PT  Imaging: had MRI done and according to mother unremarkable   Social: alert, oriented, and active. Unable to run and wants to get back running. Mother is very involved with care and did most of talking for her son    Comes in today doing well. Tolerated last session well. Was not sore. Still has similar symptoms. Had PT and was not given any new exercises. Has not tried to run yet. Is working on active care program. Denies any new issues.       Objective:  Inspection:  No SDD  No Scars  Normal Gait    Palpation:  No specific pain  Palpable soreness over left latera lower leg  Myofascitis 2/4 noted over L IT, L hip ER's, L peroneals     ROM:  Lumbar flexion   90/90  Lumbar extension  30/30, lower back pain  Right Hip IR  31/45  Left Hip IR  40/45  Right Hip ER  53/60  Left  hip ER  60/60  Ankle DF limited talar neutral B  Knee flexion and extension full and pain free    MMT:  Left glute med 4/5 with no pain  Right glute med 4/5 with no pain  Left TFL 5/5 with no pain  Right TFL 5/5 with no pain  Left piriformis 5/5 with no pain  Right piriformis 5/5 with no pain  Left ankle eversion 5/5 with mild left lower leg discomfort   Hamstrings 5/5 with no pain     MET:  L short  leg    Squat:  Shift to  L  Foot flare to L  B knee genu valgum     Lunge:  B knee genu valgum    NAL:  Restricted SI on L  Restricted cuboid on L    Ortho:  SLR (-), valgus, varus all (-)    Assessment:  NAL with associated myofascitis and weakness  Lower leg pain, shin pain, possible peroneal tendinosis     Plan:  Patient tolerated treatment well today  Treatment Time: 45 minutes  25151 manipulation 1-2 segments: MET, Hip LAD  00302 manipulation: Manual extremity  17691 Manual therapy: (ART, Graston, Strain Counter Strain, Fascial Manipulation, Cupping) performed over area of L hip ER's, L TFL, L peroneals   68765 therapeutic exercise (30 minutes): ankle mulligan, kneeling nerve floss  Single leg RDL, Short foot band walks, side plank thrust with clam,single leg sit back to double leg stand up   Strapping: hip IR on L  Taping:  Next visit: 1 week  Light therapy 5 minutes over fib head   Other: today worked lateral fascial line       Tomi Madsen DC, MEd, ATC

## 2018-04-17 ENCOUNTER — THERAPY VISIT (OUTPATIENT)
Dept: CHIROPRACTIC MEDICINE | Facility: CLINIC | Age: 23
End: 2018-04-17
Payer: COMMERCIAL

## 2018-04-17 DIAGNOSIS — M79.662 PAIN IN SHIN, LEFT: ICD-10-CM

## 2018-04-17 DIAGNOSIS — M99.06 SOMATIC DYSFUNCTION OF LOWER EXTREMITIES: Primary | ICD-10-CM

## 2018-04-17 DIAGNOSIS — M25.572 PAIN IN JOINT INVOLVING ANKLE AND FOOT, LEFT: ICD-10-CM

## 2018-04-17 DIAGNOSIS — M99.05 SOMATIC DYSFUNCTION OF PELVIS REGION: ICD-10-CM

## 2018-04-17 DIAGNOSIS — M79.10 MYALGIA: ICD-10-CM

## 2018-04-17 PROCEDURE — 97110 THERAPEUTIC EXERCISES: CPT | Performed by: CHIROPRACTOR

## 2018-04-17 PROCEDURE — 98940 CHIROPRACT MANJ 1-2 REGIONS: CPT | Mod: AT | Performed by: CHIROPRACTOR

## 2018-04-17 NOTE — PROGRESS NOTES
[facility]  Mar 26, 2018    Subjective:  Isak Drummond   22 year old   male    CC: left leg pain, runner, referral from Dr. Le and Dr. Terrazas   Medications reviewed: currently on medication for ADHD  Visit: 2/6  Goal: run, stand for 30 minutes without pain   Location: L lateral lower leg  CARMENCITA: Noted caught ankle and tripped while running September 2017 and has been sore since   Pain: varies but 6/10 on average  Previous History: Denies any significant injuries in past  Progression: not changing   Quality: ache and tightness  Radiation: Denies   Pain is worse with: running, standing for long periods  Pain is better with: rest, exercises   Timing: frequent pain   Under care: is currently working with DC and PT  Imaging: had MRI done and according to mother unremarkable   Social: alert, oriented, and active. Unable to run and wants to get back running. Mother is very involved with care and did most of talking for her son    Saw PT again. Was given fib head mobility and recommended to start walking on treadmill at incline. Symptoms are little better. Feels hip externally rotating a little, proximal attachment of posterior tibialis is tight. Lower ankle is tight. Denies any new symptoms or swelling.       Objective:  Inspection:  No SDD  No Scars  Normal Gait    Palpation:  No specific pain  Palpable soreness over left latera lower leg  Myofascitis 2/4 noted over L IT, L hip ER's, L peroneals     ROM:  Lumbar flexion   90/90  Lumbar extension  30/30, lower back pain  Right Hip IR  31/45  Left Hip IR  40/45  Right Hip ER  53/60  Left  hip ER  60/60  Ankle DF limited talar neutral B  Knee flexion and extension full and pain free    MMT:  Left glute med 4/5 with no pain  Right glute med 4/5 with no pain  Left TFL 5/5 with no pain  Right TFL 5/5 with no pain  Left piriformis 5/5 with no pain  Right piriformis 5/5 with no pain  Left ankle eversion 5/5 with mild left lower leg discomfort   Hamstrings  5/5 with no pain     MET:  L short leg    Squat:  Shift to  L  Foot flare to L  B knee genu valgum     Lunge:  B knee genu valgum    NAL:  Restricted SI on L  Restricted cuboid on L    Ortho:  SLR (-), valgus, varus all (-)    Assessment:  NAL with associated myofascitis and weakness  Lower leg pain, shin pain, possible peroneal tendinosis     Plan:  Patient tolerated treatment well today  Treatment Time: 45 minutes  58792 manipulation 1-2 segments: MET, Hip LAD  01053 manipulation: Manual extremity  81354 Manual therapy: (ART, Graston, Strain Counter Strain, Fascial Manipulation, Cupping) performed over area of L hip ER's, L TFL, L peroneals   16811 therapeutic exercise (30 minutes): ankle mulligan, kneeling nerve floss  Single leg RDL, Short foot band walks, side plank thrust with clam,single leg sit back to double leg stand up, hamstring curls on stability ball, runners pull    Strapping: hip IR on L  Taping:  Next visit: 1 week  Light therapy 5 minutes over fib head   Other: today worked lateral fascial line       Tomi Madsen DC, MEd, ATC

## 2018-04-24 ENCOUNTER — THERAPY VISIT (OUTPATIENT)
Dept: CHIROPRACTIC MEDICINE | Facility: CLINIC | Age: 23
End: 2018-04-24
Payer: COMMERCIAL

## 2018-04-24 DIAGNOSIS — M99.05 SOMATIC DYSFUNCTION OF PELVIS REGION: Primary | ICD-10-CM

## 2018-04-24 DIAGNOSIS — M99.06 SOMATIC DYSFUNCTION OF LOWER EXTREMITIES: ICD-10-CM

## 2018-04-24 DIAGNOSIS — M79.10 MYALGIA: ICD-10-CM

## 2018-04-24 DIAGNOSIS — M79.662 PAIN IN SHIN, LEFT: ICD-10-CM

## 2018-04-24 DIAGNOSIS — M25.572 PAIN IN JOINT INVOLVING ANKLE AND FOOT, LEFT: ICD-10-CM

## 2018-04-24 PROCEDURE — 97110 THERAPEUTIC EXERCISES: CPT | Performed by: CHIROPRACTOR

## 2018-04-24 PROCEDURE — 98940 CHIROPRACT MANJ 1-2 REGIONS: CPT | Mod: AT | Performed by: CHIROPRACTOR

## 2018-04-24 NOTE — PROGRESS NOTES
[facility]  Mar 26, 2018    Subjective:  Isak Drummond   22 year old   male    CC: left leg pain, runner, referral from Dr. Le and Dr. Terrazas   Medications reviewed: currently on medication for ADHD  Visit: 4/6  Goal: run, stand for 30 minutes without pain   Location: L lateral lower leg  CARMENCITA: Noted caught ankle and tripped while running September 2017 and has been sore since   Pain: varies but 6/10 on average  Previous History: Denies any significant injuries in past  Progression: not changing   Quality: ache and tightness  Radiation: Denies   Pain is worse with: running, standing for long periods  Pain is better with: rest, exercises   Timing: frequent pain   Under care: is currently working with DC and PT  Imaging: had MRI done and according to mother unremarkable   Social: alert, oriented, and active. Unable to run and wants to get back running. Mother is very involved with care and did most of talking for her son    Comes in today doing well. Notes pain is better. Pain is calf and ankle are doing very well. Most pain local over fib head. States he is getting better with treatment and pleased with results. Still has not ran yet. Still working with PT. He notes they did not give any new exercises. Denies any new symptoms.       Objective:  Inspection:  No SDD  No Scars  Normal Gait    Palpation:  No specific pain  Palpable soreness over left latera lower leg  Myofascitis 2/4 noted over L IT, L hip ER's, L peroneals     ROM:  Lumbar flexion   90/90  Lumbar extension  30/30, lower back pain  Right Hip IR  31/45  Left Hip IR  40/45  Right Hip ER  53/60  Left  hip ER  60/60  Ankle DF limited talar neutral B  Knee flexion mild discomfort over fib head on left    MMT:  Left glute med 4/5 with no pain  Right glute med 4/5 with no pain  Left TFL 5/5 with no pain  Right TFL 5/5 with no pain  Left piriformis 5/5 with no pain  Right piriformis 5/5 with no pain  Left ankle eversion 5/5 with mild  left lower leg discomfort   Hamstrings 5/5 with no pain     MET:  L short leg    Squat:  Shift to  L  Foot flare to L  B knee genu valgum     Lunge:  B knee genu valgum    NAL:  Restricted SI on L  Restricted tibial IR on left    Ortho:  SLR (-), valgus, varus all (-)    Assessment:  NAL with associated myofascitis and weakness  Lower leg pain, shin pain    Plan:  Patient tolerated treatment well today  Treatment Time: 45 minutes  01766 manipulation 1-2 segments: MET, Hip LAD  62744 manipulation: Manual extremity  79974 Manual therapy: (ART, Graston, Strain Counter Strain, Fascial Manipulation, Cupping) performed over area of L hip ER's, L TFL, L peroneals   93588 therapeutic exercise (30 minutes): ankle mulligan, kneeling nerve floss  Single leg RDL, Short foot band walks, side plank thrust with clam,single leg sit back to double leg stand up, hamstring curls on stability ball, runners pull  Strapping: hip IR on L  Taping:  Next visit: 1 week  Dry needle: 4 over lateral hamstring and calf, with IFC, tolerated well  Percussion therapy 60 seconds over posterior fib head  Other: today worked lateral fascial line       Tomi Madsen DC, MEd, ATC

## 2018-05-03 ENCOUNTER — THERAPY VISIT (OUTPATIENT)
Dept: CHIROPRACTIC MEDICINE | Facility: CLINIC | Age: 23
End: 2018-05-03
Payer: COMMERCIAL

## 2018-05-03 DIAGNOSIS — M99.05 SOMATIC DYSFUNCTION OF PELVIS REGION: ICD-10-CM

## 2018-05-03 DIAGNOSIS — M99.06 SOMATIC DYSFUNCTION OF LOWER EXTREMITIES: ICD-10-CM

## 2018-05-03 DIAGNOSIS — M25.572 PAIN IN JOINT INVOLVING ANKLE AND FOOT, LEFT: ICD-10-CM

## 2018-05-03 DIAGNOSIS — M79.10 MYALGIA: ICD-10-CM

## 2018-05-03 DIAGNOSIS — M79.662 PAIN IN SHIN, LEFT: ICD-10-CM

## 2018-05-03 PROCEDURE — 97110 THERAPEUTIC EXERCISES: CPT | Performed by: CHIROPRACTOR

## 2018-05-03 PROCEDURE — 98940 CHIROPRACT MANJ 1-2 REGIONS: CPT | Mod: AT | Performed by: CHIROPRACTOR

## 2018-05-03 NOTE — PROGRESS NOTES
[facility]  Mar 26, 2018    Subjective:  Isak Drummond   22 year old   male    CC: left leg pain, runner, referral from Dr. Le and Dr. Terrazas   Medications reviewed: currently on medication for ADHD  Visit: 5/6  Goal: run, stand for 30 minutes without pain   Location: L lateral lower leg  CARMENCITA: Noted caught ankle and tripped while running September 2017 and has been sore since   Pain: varies but 6/10 on average  Previous History: Denies any significant injuries in past  Progression: not changing   Quality: ache and tightness  Radiation: Denies   Pain is worse with: running, standing for long periods  Pain is better with: rest, exercises   Timing: frequent pain   Under care: is currently working with DC and PT  Imaging: had MRI done and according to mother unremarkable   Social: alert, oriented, and active. Unable to run and wants to get back running. Mother is very involved with care and did most of talking for her son    Comes in today doing well. Notes last treatment helped. Felt good until this last Sunday. Notes symptoms started to return on Monday. Notes that runners pull and runners dead lift exercise is helping. Notes that working active care program. [place] oscillation helped last session.  Pain arch of foot over last 5 days. Denies any CARMENCITA. Denies any radiating pain. Notes that when ankle is going into pronation the proximal fib head feels better.       Objective:  Inspection:  No SDD  No Scars  Normal Gait    Palpation:  No specific pain  Palpable soreness over left latera lower leg  Myofascitis 2/4 noted over L IT, L hip ER's, L peroneals     ROM:  Lumbar flexion   90/90  Lumbar extension  30/30, lower back pain  Right Hip IR  31/45  Left Hip IR  40/45  Right Hip ER  53/60  Left  hip ER  60/60  Ankle DF limited talar neutral B  Knee flexion mild discomfort over fib head on left    MMT:  Left glute med 4/5 with no pain  Right glute med 4/5 with no pain  Left TFL 5/5 with no  pain  Right TFL 5/5 with no pain  Left piriformis 5/5 with no pain  Right piriformis 5/5 with no pain  Left ankle eversion 5/5 with mild left lower leg discomfort   Hamstrings 5/5 with no pain     MET:  L short leg    Squat:  Shift to  L  Foot flare to L  B knee genu valgum     Lunge:  B knee genu valgum    NAL:  Restricted SI on L  Restricted tibial IR on left    Ortho:  SLR (-), valgus, varus all (-)    Assessment:  NAL with associated myofascitis and weakness  Lower leg pain, shin pain    Plan:  Patient tolerated treatment well today  Treatment Time: 45 minutes  06111 manipulation 1-2 segments: MET, Hip LAD  74507 manipulation: Manual extremity  57172 Manual therapy: (ART, Graston, Strain Counter Strain, Fascial Manipulation, Cupping) performed over area of L hip ER's, L TFL, L peroneals   80235 therapeutic exercise (30 minutes): ankle mulligan, kneeling nerve floss  Single leg RDL, Short foot band walks, side plank thrust with clam,single leg sit back to double leg stand up, hamstring curls on stability ball, runners pull, monster walks  Strapping: hip IR on L  Taping:  Next visit: 2 week  Dry needle: 4 over lateral hamstring and calf, with IFC, tolerated well (did not do today)  Percussion therapy 60 seconds over posterior fib head  Shockwave: 10/5      Tomi Madsen DC, MEd, ATC

## 2018-05-24 ENCOUNTER — THERAPY VISIT (OUTPATIENT)
Dept: CHIROPRACTIC MEDICINE | Facility: CLINIC | Age: 23
End: 2018-05-24
Payer: COMMERCIAL

## 2018-05-24 DIAGNOSIS — M99.06 SOMATIC DYSFUNCTION OF LOWER EXTREMITIES: ICD-10-CM

## 2018-05-24 DIAGNOSIS — M79.662 PAIN IN SHIN, LEFT: ICD-10-CM

## 2018-05-24 DIAGNOSIS — M25.572 PAIN IN JOINT INVOLVING ANKLE AND FOOT, LEFT: ICD-10-CM

## 2018-05-24 DIAGNOSIS — M99.05 SOMATIC DYSFUNCTION OF PELVIS REGION: ICD-10-CM

## 2018-05-24 DIAGNOSIS — M79.10 MYALGIA: ICD-10-CM

## 2018-05-24 PROCEDURE — 98940 CHIROPRACT MANJ 1-2 REGIONS: CPT | Mod: AT | Performed by: CHIROPRACTOR

## 2018-05-24 PROCEDURE — 97110 THERAPEUTIC EXERCISES: CPT | Performed by: CHIROPRACTOR

## 2018-05-24 NOTE — PROGRESS NOTES
[facility]  Mar 26, 2018    Subjective:  Isak Drummond   22 year old   male    CC: left leg pain, runner, referral from Dr. Le and Dr. Terrazas   Medications reviewed: currently on medication for ADHD  Visit: 6  Goal: run, stand for 30 minutes without pain   Location: L lateral lower leg  CARMENCITA: Noted caught ankle and tripped while running September 2017 and has been sore since   Pain: varies but 6/10 on average  Previous History: Denies any significant injuries in past  Progression: not changing   Quality: ache and tightness  Radiation: Denies   Pain is worse with: running, standing for long periods  Pain is better with: rest, exercises   Timing: frequent pain   Under care: is currently working with DC and PT  Imaging: had MRI done and according to mother unremarkable   Social: alert, oriented, and active. Unable to run and wants to get back running. Mother is very involved with care and did most of talking for her son    Comes in today doing well. Notes shock wave was very beneficial. Notes symptoms continue to improve. He has started to run so we went over the program of returning to running. He denies any new issues. Notes location for discomfort is over lateral ankle, and lateral knee. Notes tightness along shin. Denies any new changes in health history.       Objective:  Inspection:  No SDD  No Scars  Normal Gait    Palpation:  No specific pain  Palpable soreness over left latera lower leg  Myofascitis 2/4 noted over L IT, L hip ER's, L peroneals     ROM:  Lumbar flexion   90/90  Lumbar extension  30/30, lower back pain  Right Hip IR  31/45  Left Hip IR  40/45  Right Hip ER  53/60  Left  hip ER  60/60  Ankle DF limited talar neutral B  Knee flexion mild discomfort over fib head on left    MMT:  Left glute med 4/5 with no pain  Right glute med 4/5 with no pain  Left TFL 5/5 with no pain  Right TFL 5/5 with no pain  Left piriformis 5/5 with no pain  Right piriformis 5/5 with no pain  Left  ankle eversion 5/5 with mild left lower leg discomfort   Hamstrings 5/5 with no pain     MET:  L short leg    Squat:  Shift to  L  Foot flare to L  B knee genu valgum     Lunge:  B knee genu valgum    NAL:  Restricted SI on L  Restricted tibial IR on left    Ortho:  SLR (-), valgus, varus all (-)    Assessment:  NAL with associated myofascitis and weakness  Lower leg pain, shin pain    Plan:  Patient tolerated treatment well today  Treatment Time: 45 minutes  18708 manipulation 1-2 segments: MET, Hip LAD  55668 manipulation: Manual extremity  46663 Manual therapy: (ART, Graston, Strain Counter Strain, Fascial Manipulation, Cupping) performed over area of L hip ER's, L TFL, L peroneals   27260 therapeutic exercise (30 minutes): ankle mulligan, kneeling nerve floss  Single leg RDL, Short foot band walks, side plank thrust with clam,single leg sit back to double leg stand up, hamstring curls on stability ball, runners pull, monster walks  Strapping: hip IR on L  Taping:  Next visit: 2 week  Dry needle: 4 over lateral hamstring and calf, with IFC, tolerated well (did not do today)  Percussion therapy 60 seconds over posterior fib head  Shockwave: 10/5 fib head, 10/4 lateral ankle, 15 was to painful to do      Tomi Madsen DC, MEd, ATC

## 2018-09-25 DIAGNOSIS — F90.0 ADHD (ATTENTION DEFICIT HYPERACTIVITY DISORDER), INATTENTIVE TYPE: ICD-10-CM

## 2018-09-25 RX ORDER — ATOMOXETINE 60 MG/1
60 CAPSULE ORAL DAILY
Qty: 30 CAPSULE | Refills: 0 | Status: SHIPPED | OUTPATIENT
Start: 2018-09-25 | End: 2018-10-04

## 2018-09-26 NOTE — TELEPHONE ENCOUNTER
Medication requested: atomoxetine (STRATTERA) 60 MG capsule  Last refilled: 8/22/18  Qty: 30      Last seen: 3/28/18  RTC: 6 months  Cancel: 0  No-show: 0  Next appt: not scheduled    Refill decision:   30 day randal refill sent to the pharmacy - including instructions for patient to call the clinic and schedule an appointment.

## 2018-10-04 ENCOUNTER — OFFICE VISIT (OUTPATIENT)
Dept: PSYCHIATRY | Facility: CLINIC | Age: 23
End: 2018-10-04
Attending: PSYCHIATRY & NEUROLOGY
Payer: COMMERCIAL

## 2018-10-04 VITALS — DIASTOLIC BLOOD PRESSURE: 71 MMHG | SYSTOLIC BLOOD PRESSURE: 133 MMHG | HEART RATE: 83 BPM | WEIGHT: 127.1 LBS

## 2018-10-04 DIAGNOSIS — F90.0 ADHD (ATTENTION DEFICIT HYPERACTIVITY DISORDER), INATTENTIVE TYPE: ICD-10-CM

## 2018-10-04 PROCEDURE — G0463 HOSPITAL OUTPT CLINIC VISIT: HCPCS | Mod: ZF

## 2018-10-04 RX ORDER — ATOMOXETINE 60 MG/1
60 CAPSULE ORAL DAILY
Qty: 30 CAPSULE | Refills: 2 | Status: SHIPPED | OUTPATIENT
Start: 2018-10-25 | End: 2019-01-30

## 2018-10-04 RX ORDER — ATOMOXETINE 10 MG/1
CAPSULE ORAL
Qty: 30 CAPSULE | Refills: 2 | Status: SHIPPED | OUTPATIENT
Start: 2018-10-04 | End: 2019-01-31

## 2018-10-04 ASSESSMENT — PAIN SCALES - GENERAL: PAINLEVEL: NO PAIN (0)

## 2018-10-04 NOTE — NURSING NOTE
Chief Complaint   Patient presents with     Recheck Medication     ADHD (attention deficit hyperactivity disorder), inattentive type

## 2018-10-04 NOTE — MR AVS SNAPSHOT
After Visit Summary   10/4/2018    Isak Drummond    MRN: [id number]           Patient Information     Date Of Birth          1995        Visit Information        Provider Department      10/4/2018 4:30 PM Mary Sorenson MD [facility]        Today's Diagnoses     ADHD (attention deficit hyperactivity disorder), inattentive type           Follow-ups after your visit        Who to contact     Please call your clinic at [phone] to:    Ask questions about your health    Make or cancel appointments    Discuss your medicines    Learn about your test results    Speak to your doctor            Additional Information About Your Visit        MyChart Information     [employer] is an electronic gateway that provides easy, online access to your medical records. With [employer], you can request a clinic appointment, read your test results, renew a prescription or communicate with your care team.     To sign up for mygallt visit the website at [email or web address]   You will be asked to enter the access code listed below, as well as some personal information. Please follow the directions to create your username and password.     Your access code is: [email or web address]  Expires: 2019  4:30 PM     Your access code will  in 90 days. If you need help or a new code, please contact your [facility] or call [phone] for assistance.        Care EveryWhere ID     This is your Care EveryWhere ID. This could be used by other organizations to access your [facility] medical records  [id number]        Your Vitals Were     Pulse                   83            Blood Pressure from Last 3 Encounters:   10/04/18 133/71   18 113/71   17 123/81    Weight from Last 3 Encounters:   10/04/18 57.7 kg (127 lb 1.6 oz)   18 57.8 kg (127 lb 6.4 oz)   17 58.7 kg (129 lb 6.4 oz)              Today, you had the following     No orders found for display          Today's Medication Changes          These changes are accurate as of 10/4/18  5:40 PM.  If you have any questions, ask your nurse or doctor.               These medicines have changed or have updated prescriptions.        Dose/Directions    * atomoxetine 10 MG capsule   Commonly known as:  STRATTERA   This may have changed:  You were already taking a medication with the same name, and this prescription was added. Make sure you understand how and when to take each.   Used for:  ADHD (attention deficit hyperactivity disorder), inattentive type   Changed by:  Mary Sorenson MD        Take 1 capsule daily with one 60 mg capsule for TDD of 70 mg Strattera.   Quantity:  30 capsule   Refills:  2       * atomoxetine 60 MG capsule   Commonly known as:  STRATTERA   This may have changed:  These instructions start on 10/25/2018. If you are unsure what to do until then, ask your doctor or other care provider.   Used for:  ADHD (attention deficit hyperactivity disorder), inattentive type   Changed by:  Mary Sorenson MD        Dose:  60 mg   Start taking on:  10/25/2018   Take 1 capsule (60 mg) by mouth daily   Quantity:  30 capsule   Refills:  2       * Notice:  This list has 2 medication(s) that are the same as other medications prescribed for you. Read the directions carefully, and ask your doctor or other care provider to review them with you.      Stop taking these medicines if you haven't already. Please contact your care team if you have questions.     imipramine 10 MG tablet   Commonly known as:  TOFRANIL   Stopped by:  Mary Sorenson MD                Where to get your medicines      These medications were sent to [id number] - [place], [place] - [address] AT [facility] [address], [place] [address]     Phone:  [phone]     atomoxetine 10 MG capsule    atomoxetine 60 MG capsule                Primary Care Provider Fax #    Physician No Ref-Primary [phone]       No  address on file        Equal Access to Services     [facility]: Hadii aad kaela taj Russali, sumavolodymyr frazieryvonneha, geeta nealrosemaryvolodymyr elizabeth, waxcherelle ervin delucafigueroajessica callahan. So [facility] [phone].    ATENCIÓN: Si habla español, tiene a busby disposición servicios gratuitos de asistencia lingüística. Llame al [phone].    We comply with applicable federal civil rights laws and [place] laws. We do not discriminate on the basis of race, color, national origin, age, disability, sex, sexual orientation, or gender identity.            Thank you!     Thank you for choosing [facility]  for your care. Our goal is always to provide you with excellent care. Hearing back from our patients is one way we can continue to improve our services. Please take a few minutes to complete the written survey that you may receive in the mail after your visit with us. Thank you!             Your Updated Medication List - Protect others around you: Learn how to safely use, store and throw away your medicines at www.disposemymeds.org.          This list is accurate as of 10/4/18  5:40 PM.  Always use your most recent med list.                   Brand Name Dispense Instructions for use Diagnosis    * atomoxetine 10 MG capsule    STRATTERA    30 capsule    Take 1 capsule daily with one 60 mg capsule for TDD of 70 mg Strattera.    ADHD (attention deficit hyperactivity disorder), inattentive type       * atomoxetine 60 MG capsule   Start taking on:  10/25/2018    STRATTERA    30 capsule    Take 1 capsule (60 mg) by mouth daily    ADHD (attention deficit hyperactivity disorder), inattentive type       * Notice:  This list has 2 medication(s) that are the same as other medications prescribed for you. Read the directions carefully, and ask your doctor or other care provider to review them with you.

## 2018-10-04 NOTE — PROGRESS NOTES
Vital signs: See charted vitals    History of present illness: Isak attends the appointment with his parents. Patient continues to find benefit for his ADHD symptoms with Strattera. He is able to focus better throughout the day. There have been no side effects from strattera. 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  He will be starting a FT job at [employer] next week.  May and beginning a job search.    On imipramine 20 mg, he was having SEs (circulation problems in his fingers) so he stopped it and enuresis got a bit worse.  Does not want to restart imipramine now.      His mood has been fine. There is no insomnia. Appetite is very good. Energy is normal. There is no evidence of suicidal ideation. Concentration is improved on Strattera. However, he is taking [employer] hard classes so he would like to try increase in STrattera.    CURRENT MEDICATIONS:   1.  Strattera 60 mg per day.       SIDE EFFECTS: had circulation problems in his fingers while on imipramine 20 mg/day    MED ROS:  Negative      MENTAL STATUS EXAMINATION:  Isak is casually dressed and groomed.  He maintains good eye contact.  He answers questions intelligently.  His mood is neutral.  Affect is full range and appropriate.  Thinking is linear and goal-directed, associations are intact.  There is no psychosis.  There is no evidence of suicidal ideation.  Isak is oriented.  His recent and remote memory, attention, concentration, language and fund of knowledge are all within normal limits.  No speech or motor abnormalities.  Insight and judgment are good.       DIAGNOSES:   1.  ADHD, predominantly inattentive.   2.  History of mood disorder NOS.   3.  Nocturnal enuresis.       RECOMMENDATIONS:   1.  Increase Strattera to 70 mg/day  2.  Call with progress report if side effects are noted or a change in dose is requested.   2.  Return to clinic in 6-9 months and earlier if needed.       TOTAL TIME SPENT:   Face-to-face with Isak and his parents was 30 minutes with greater than 50% of the time spent in counseling and coordination of care.  Counseling included assessment of symptoms and functioning, discussion of starting graduate school and beginning new job next week.

## 2018-11-20 ENCOUNTER — TELEPHONE (OUTPATIENT)
Dept: PSYCHIATRY | Facility: CLINIC | Age: 23
End: 2018-11-20

## 2018-11-20 DIAGNOSIS — F90.0 ADHD (ATTENTION DEFICIT HYPERACTIVITY DISORDER), INATTENTIVE TYPE: ICD-10-CM

## 2018-11-20 NOTE — TELEPHONE ENCOUNTER
Writer received incoming phone call from the pt's father, Norm at [phone]. The pt's father says he's calling to provide an update as the provider wanted the pt to try atomoxetine 60 mg and 70 mg for his ADHD symptoms. The pt has tried both doses and relayed to dad that 70 mg seems to be the better dose. Writer inquired about the pt's symptoms, but dad said he's unable to answer these questions, and is only going off of what the pt mentioned. Writer voiced understanding and agreed to relay this to the provider. Dad requested a refill of the medication, but it appears he should have refills of both doses on file.

## 2019-01-30 DIAGNOSIS — F90.0 ADHD (ATTENTION DEFICIT HYPERACTIVITY DISORDER), INATTENTIVE TYPE: ICD-10-CM

## 2019-01-30 RX ORDER — ATOMOXETINE 10 MG/1
CAPSULE ORAL
Qty: 30 CAPSULE | Refills: 2 | Status: CANCELLED | OUTPATIENT
Start: 2019-01-30

## 2019-01-30 NOTE — TELEPHONE ENCOUNTER
Medication requested: atomoxetine (STRATTERA) 60 MG capsule  Last refilled: 1-2-19  Qty: 30  *1.  Increase Strattera to 70 mg/day  2.  Call with progress report if side effects are noted or a change in dose is requested  11-20-18 Telephone note:The pt has tried both doses and relayed to dad that 70 mg seems to be the better dose    Medication requested: atomoxetine (STRATTERA) 10 MG capsule  Last refilled:1-4-19  Qty: 30    Last seen: 10-4-18  RTC: 6-9 months  Cancel: 0  No-show: 0  Next appt: none      Refill decision:Refill pended and routed to the provider for review/determination due to   Ok to give more refills as RTC not due til April-June.   30 day: 2 RF pending,including instructions for patient to call the clinic and schedule an appointment.    Scheduling has been notified to contact the pt for appointment.

## 2019-01-31 RX ORDER — ATOMOXETINE 60 MG/1
60 CAPSULE ORAL DAILY
Qty: 90 CAPSULE | Refills: 0 | Status: SHIPPED | OUTPATIENT
Start: 2019-01-31 | End: 2019-05-23

## 2019-01-31 NOTE — TELEPHONE ENCOUNTER
Writer received incoming phone call from pt's father. He's requesting a refill of the pt's medication, preferably only 60 mg, as family feels this is the better dose. Dad reports 70 mg daily made the pt very anxious.     The pt's father requested 90 d/s with multiple refills. Per provider's last office visit note, RTC is 6-9 months.     Writer provided 90 d/s. Will confirm with provider if she'd like to provide an additional 90 d/s refill. Will also inform her that 60 mg is the preferred dose.

## 2019-02-01 RX ORDER — ATOMOXETINE 60 MG/1
60 CAPSULE ORAL DAILY
Qty: 90 CAPSULE | Refills: 0 | Status: SHIPPED | OUTPATIENT
Start: 2019-02-01 | End: 2019-05-24

## 2019-02-01 NOTE — TELEPHONE ENCOUNTER
Mary Sorenson MD Pratt, Laura, RN   Caller: Unspecified (2 days ago, 11:37 AM)             LEE Witt to authorize additional 90 DS of Strattera 60 mg/day (i.e., additional 90 DS beyond the one your ordered on  1/31/19).   Mary      Writerayna sent additional 90 d/s per provider's approval.

## 2019-05-23 ENCOUNTER — OFFICE VISIT (OUTPATIENT)
Dept: PSYCHIATRY | Facility: CLINIC | Age: 24
End: 2019-05-23
Attending: PSYCHIATRY & NEUROLOGY
Payer: COMMERCIAL

## 2019-05-23 VITALS — WEIGHT: 127 LBS | SYSTOLIC BLOOD PRESSURE: 125 MMHG | HEART RATE: 102 BPM | DIASTOLIC BLOOD PRESSURE: 81 MMHG

## 2019-05-23 DIAGNOSIS — F90.0 ADHD (ATTENTION DEFICIT HYPERACTIVITY DISORDER), INATTENTIVE TYPE: ICD-10-CM

## 2019-05-23 PROCEDURE — G0463 HOSPITAL OUTPT CLINIC VISIT: HCPCS | Mod: ZF

## 2019-05-23 RX ORDER — ATOMOXETINE 60 MG/1
60 CAPSULE ORAL DAILY
Qty: 90 CAPSULE | Refills: 0 | Status: SHIPPED | OUTPATIENT
Start: 2019-05-23 | End: 2019-05-24

## 2019-05-23 ASSESSMENT — PAIN SCALES - GENERAL: PAINLEVEL: NO PAIN (0)

## 2019-05-23 NOTE — PROGRESS NOTES
Vital signs: See charted vitals    History of present illness: Isak attends the appointment with his parents. Patient continues to find benefit for his ADHD symptoms with Strattera. He is able to focus better throughout the day. There have been no side effects from strattera. 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  He was in a FT job at [employer], but quit after 6+ months due to not liking the work.  He is trying to figure what he wants to do next.  next week.  May and beginning a job search.    Off imipramine and enuresis is better.  He has wetting less than once a week.     His mood has been fine. There is no insomnia. Appetite is very good. Energy is normal. There is no suicidal ideation. Concentration is improved on Strattera. He reduced the dose back to 60 mg because 70 mg was not clearly better and he was having poor circulation in his fingers and parents wondered if this was due to higher dose of STrattera.    CURRENT MEDICATIONS:   1.  Strattera 60 mg per day.       SIDE EFFECTS: circulation problems in his fingers    MED ROS:  Serious knee (ligaments) and leg injury from fall while running.        MENTAL STATUS EXAMINATION:  Isak is casually dressed and groomed.  He maintains good eye contact.  He answers questions intelligently.  His mood is neutral.  Affect is full range and appropriate.  Thinking is linear and goal-directed, associations are intact.  There is no psychosis.  There is no suicidal ideation.  Isak is oriented.  His recent and remote memory, attention, concentration, language and fund of knowledge are all within normal limits.  No speech or motor abnormalities.  Insight and judgment are good.       DIAGNOSES:   1.  ADHD, predominantly inattentive.   2.  History of mood disorder NOS.   3.  Nocturnal enuresis - improved.       RECOMMENDATIONS:   1.  Strattera 60 mg/day  2.  Return to clinic in 9-12 months and earlier if needed.       TOTAL  TIME SPENT:  Face-to-face with Isak and his parents was 35 minutes with greater than 50% of the time spent in counseling and coordination of care.  Counseling included assessment of symptoms and functioning, discussion of plans for future.

## 2019-05-24 ENCOUNTER — TELEPHONE (OUTPATIENT)
Dept: PSYCHIATRY | Facility: CLINIC | Age: 24
End: 2019-05-24

## 2019-05-24 DIAGNOSIS — F90.0 ADHD (ATTENTION DEFICIT HYPERACTIVITY DISORDER), INATTENTIVE TYPE: ICD-10-CM

## 2019-05-24 RX ORDER — ATOMOXETINE 60 MG/1
60 CAPSULE ORAL DAILY
Qty: 90 CAPSULE | Refills: 2 | Status: SHIPPED | OUTPATIENT
Start: 2019-05-24 | End: 2021-07-07

## 2019-05-24 NOTE — TELEPHONE ENCOUNTER
Writer received note from provider requesting this writer to discontinue recent Strattera order and resend as the following:    Strattera 60 mg PO QAM, #90 with 2 RFs to start on 7/25/19. Writer resent Rx with note to discontinue previous Rx and to not fill before 7/25.

## 2019-08-06 DIAGNOSIS — F90.0 ADHD (ATTENTION DEFICIT HYPERACTIVITY DISORDER), INATTENTIVE TYPE: ICD-10-CM

## 2019-08-06 RX ORDER — ATOMOXETINE 10 MG/1
CAPSULE ORAL
Qty: 30 CAPSULE | Refills: 0 | OUTPATIENT
Start: 2019-08-06

## 2020-11-11 ENCOUNTER — VIRTUAL VISIT (OUTPATIENT)
Dept: PSYCHIATRY | Facility: CLINIC | Age: 25
End: 2020-11-11
Attending: PSYCHIATRY & NEUROLOGY
Payer: COMMERCIAL

## 2020-11-11 DIAGNOSIS — F90.0 ADHD (ATTENTION DEFICIT HYPERACTIVITY DISORDER), INATTENTIVE TYPE: Primary | ICD-10-CM

## 2020-11-11 PROCEDURE — 99214 OFFICE O/P EST MOD 30 MIN: CPT | Mod: 95 | Performed by: PSYCHIATRY & NEUROLOGY

## 2020-11-11 RX ORDER — GUANFACINE 1 MG/1
TABLET ORAL
Qty: 135 TABLET | Refills: 1 | Status: SHIPPED | OUTPATIENT
Start: 2020-11-11 | End: 2021-07-07

## 2020-11-11 ASSESSMENT — PAIN SCALES - GENERAL: PAINLEVEL: NO PAIN (0)

## 2020-11-11 NOTE — PROGRESS NOTES
"VIDEO VISIT  Isak Drummond is a 25 year old patient who is being evaluated via a billable video visit.      The patient has been notified of following:   \"This video visit will be conducted via a call between you and your physician/provider. We have found that certain health care needs can be provided without the need for an in-person physical exam. This service lets us provide the care you need with a video conversation. If a prescription is necessary we can send it directly to your pharmacy. If lab work is needed we can place an order for that and you can then stop by our lab to have the test done at a later time. Insurers are generally covering virtual visits as they would in-office visits so billing should not be different than normal.  If for some reason you do get billed incorrectly, you should contact the billing office to correct it and that number is in the AVS .    Video Conference to be completed via:  [email or web address]    Patient has given verbal consent for video visit?:  Yes    Patient would prefer that any video invitations be sent by: Text to cell phone: [phone]      How would patient like to obtain AVS?:  Mail a copy    AVS SmartPhrase [PsychAVS] has been placed in 'Patient Instructions':  Yes     Vital signs: Not obtained due to covid    History of present illness: Isak attends the appointment with his parents. Patient stopped taking STrattera about 1 year ago.  He was not in school and did not have a job so , so he decided to stop the medication.  Without the STrattera, both patient and parents noticed a slight increase in inattention and distractibility.  Parents noted that he could not get things done or activities took longer to finish.       Isak reports that mood is fine.  He is eating more.  Sleep is OK.  He has been staying up later.  Concentration is an issue.  Isak denies SI.  The future looks good.     Reviewed EMR notes about use of guanfacine 3-4 years ago for ADHD>  He found " relief of ADHD symptoms on dose of guanfacine 1.5 mg BID.  At that dose he started to have sedation.     Cyril is looking for a job.  He spends his day, running, reading , and applying for jobs.       CURRENT MEDICATIONS:     None - discontinued Strattera 60 mg ~ 1 year ago.       SIDE EFFECTS: not discussed     MED ROS:  Serious knee (ligaments) and leg injury from fall while running.        MENTAL STATUS EXAMINATION:  Isak is casually dressed and groomed.  He maintains good eye contact.  He answers questions intelligently.  His mood is neutral.  Affect is full range and appropriate.  Thinking is linear and goal-directed, associations are intact.  There is no psychosis.  There is no suicidal ideation.  Isak is oriented.  His recent and remote memory, attention, concentration, language and fund of knowledge are all within normal limits.  No speech or motor abnormalities.  Insight and judgment are good.       DIAGNOSES:   1.  ADHD, predominantly inattentive.   2.  History of mood disorder NOS.         RECOMMENDATIONS:   1.  Guanfacine, see medication tab for titration schedule  2.  Call with progress report in 4-6 weeks  2.  Return to clinic in 3-4 months and earlier if needed.     Video- Visit Details  Type of service:  video visit for medication management  Time of service:    Date:  11/11/2020    Video Start Time:  415 pm        Video End Time:  445 pm    Reason for video visit:  Patient unable to travel due to Covid-19  Originating Site (patient location):  [facility]   Location- Patient's home  Distant Site (provider location):  Remote location  Mode of Communication:  Video Conference via [email or web address]  Consent:  Patient has given verbal consent for video visit?: Yes

## 2020-11-11 NOTE — PATIENT INSTRUCTIONS
Thank you for coming to the [facility] MENTAL HEALTH & ADDICTION [place] [facility]IN[facility].    Lab Testing:  If you had lab testing today and your results are reassuring or normal they will be mailed to you or sent through IMImobile within 7 days. If the lab tests need quick action we will call you with the results. The phone number we will call with results is # [phone] (home) . If this is not the best number please call our clinic and change the number.    Medication Refills:  If you need any refills please call your pharmacy and they will contact us. Our fax number for refills is [phone]. Please allow three business for refill processing. If you need to  your refill at a new pharmacy, please contact the new pharmacy directly. The new pharmacy will help you get your medications transferred.     Scheduling:  If you have any concerns about today's visit or wish to schedule another appointment please call our office during normal business hours [phone] (8-5:00 M-F)    Contact Us:  Please call [phone] during business hours (8-5:00 M-F).  If after clinic hours, or on the weekend, please call  [phone].    Financial Assistance [phone]  [facility] Billing [phone]  Central Billing Office, [facility]: [phone]  [place] Billing [phone]  Medical Records [phone]  [place] Patient Bill of Rights [email or web address]       MENTAL HEALTH CRISIS NUMBERS:  For a medical emergency please call  911 or go to the nearest ER.     [facility]:   [facility] -[phone]   Crisis Residence [facility] Residence -[phone]   Walk-In Counseling Center [facility] -[phone]   COPE 24/7 [place] Mobile Team -[phone] (adults)/[phone] (child)  CHILD: [facility] needs assessment team - [phone]      [facility]:   [facility] - [phone]   Walk-in counseling [facility]  [facility] - [phone]   Walk-in counseling [facility] - [phone]   Crisis Residence [facility] Residence - [phone]  Urgent Care Adult Mental [place]-[phone] mobile unit/ 24/7 crisis line    National Crisis Numbers:   National Suicide Prevention Lifeline: 7-687-937-TALK (031-815-8682)  Poison Control Center - 1-636.508.1621  [employer]/resources for a list of additional resources (SOS)  Trans Lifeline a hotline for transgender people 1-253-791-2286  The inFreeDA Project a hotline for LGBT youth 1-538.898.4931  Crisis Text Line: For any crisis 24/7   To: 951140  see www.crisistextline.org  - IF MAKING A CALL FEELS TOO HARD, send a text!         Again thank you for choosing [facility] MENTAL HEALTH & ADDICTION [facility] and please let us know how we can best partner with you to improve you and your family's health.    You may be receiving a survey regarding this appointment. We would love to have your feedback, both positive and negative. The survey is done by an external company, so your answers are anonymous.

## 2021-07-07 ENCOUNTER — VIRTUAL VISIT (OUTPATIENT)
Dept: PSYCHIATRY | Facility: CLINIC | Age: 26
End: 2021-07-07
Attending: PSYCHIATRY & NEUROLOGY
Payer: COMMERCIAL

## 2021-07-07 DIAGNOSIS — F90.0 ADHD (ATTENTION DEFICIT HYPERACTIVITY DISORDER), INATTENTIVE TYPE: ICD-10-CM

## 2021-07-07 PROCEDURE — 99215 OFFICE O/P EST HI 40 MIN: CPT | Mod: 95 | Performed by: PSYCHIATRY & NEUROLOGY

## 2021-07-07 RX ORDER — GUANFACINE 2 MG/1
2 TABLET, EXTENDED RELEASE ORAL EVERY MORNING
Qty: 30 TABLET | Refills: 1 | Status: SHIPPED | OUTPATIENT
Start: 2021-07-16 | End: 2021-09-15

## 2021-07-07 RX ORDER — GUANFACINE 1 MG/1
1 TABLET ORAL
Qty: 30 TABLET | Refills: 0 | Status: SHIPPED | OUTPATIENT
Start: 2021-07-07 | End: 2022-08-01

## 2021-07-07 ASSESSMENT — PAIN SCALES - GENERAL: PAINLEVEL: NO PAIN (0)

## 2021-07-07 NOTE — PROGRESS NOTES
"VIDEO VISIT  Isak Drummond is a 26 year old patient who is being evaluated via a billable video visit.      The patient has been notified of following:   \"This video visit will be conducted via a call between you and your physician/provider. We have found that certain health care needs can be provided without the need for an in-person physical exam. This service lets us provide the care you need with a video conversation. If a prescription is necessary we can send it directly to your pharmacy. If lab work is needed we can place an order for that and you can then stop by our lab to have the test done at a later time. Insurers are generally covering virtual visits as they would in-office visits so billing should not be different than normal.  If for some reason you do get billed incorrectly, you should contact the billing office to correct it and that number is in the AVS .    Video Conference to be completed via:  [email or web address]    Patient has given verbal consent for video visit?:  Yes    Patient would prefer that any video invitations be sent by: Send to e-mail at: [email or web address]      How would patient like to obtain AVS?:  Mail a copy    AVS SmartPhrase [PsychAVS] has been placed in 'Patient Instructions':  Yes         Vital signs: Not obtained due to covid    History of present illness: Isak attends the appointment with his mother. He is living at home.  He has a job doing loans since 11/2020.  He had major surgery on his knee on 5/4/21 and is doing PT.    Guanfacine 0.5 mg in am and 1 mg at hs has been beneficial for improving focus.  \"It definitely helps\" per Isak.  He has been off guanfacine for 10 days due to running out of script.         Isak reports that mood is fine.  He is eating OK.  Sleep is effected by pain. Energy is dedreased due to surgery.   Isak denies SI.  The future looks OK.     Isak denies worries. No family issues.       CURRENT MEDICATIONS: HPI    discontinued Strattera 60 mg " ~ 1.5 year ago.       SIDE EFFECTS: not discussed     MED ROS:  Surgery, See HPI.        MENTAL STATUS EXAMINATION:  Isak is casually dressed and groomed.  He maintains good eye contact.  He answers questions intelligently.  His mood is neutral.  Affect is full range and appropriate.  Thinking is linear and goal-directed, associations are intact.  There is no psychosis.  There is no suicidal ideation.  Isak is oriented.  His recent and remote memory, attention, concentration, language and fund of knowledge are all within normal limits.  No speech or motor abnormalities.  Insight and judgment are good.       DIAGNOSES:   1.  ADHD, predominantly inattentive.   2.  History of mood disorder NOS.         RECOMMENDATIONS:   1.  Guanfacine - increase to 1 mg bid for 2 weeks, if tolerated, change to Intuniv 2 mg q am  2.  Call with progress report  2.  Return to clinic in 6 months and earlier if needed.     Video- Visit Details  Type of service:  video visit for medication management  Time of service:    Date:  07/07/2021    Video Start Time:  335 pm        Video End Time:  356 pm    Chart review 5 min    Documentation 17 min      43 minutes spent on the date of the encounter doing chart review, history and exam, documentation and further activities per the note  [id number]}    Reason for video visit:  Patient unable to travel due to Covid-19  Originating Site (patient location):  [facility] [place]   Location- Patient's home  Distant Site (provider location):  Remote location  Mode of Communication:  Video Conference via [email or web address]  Consent:  Patient has given verbal consent for video visit?: Yes

## 2021-07-07 NOTE — PATIENT INSTRUCTIONS
**For crisis resources, please see the information at the end of this document**     Patient Education      Thank you for coming to the [facility] MENTAL HEALTH & ADDI[facility] [place] [facility].    Lab Testing:  If you had lab testing today and your results are reassuring or normal they will be mailed to you or sent through S[employer] within 7 days. If the lab tests need quick action we will call you with the results. The phone number we will call with results is # [phone] (home) . If this is not the best number please call our clinic and change the number.    Medication Refills:  If you need any refills please call your pharmacy and they will contact us. Our fax number for refills is [phone]. Please allow three business for refill processing. If you need to  your refill at a new pharmacy, please contact the new pharmacy directly. The new pharmacy will help you get your medications transferred.     Scheduling:  If you have any concerns about today's visit or wish to schedule another appointment please call our office during normal business hours [phone] (8-5:00 M-F)    Contact Us:  Please call [phone] during business hours (8-5:00 M-F).  If after clinic hours, or on the weekend, please call  [phone].    Financial Assistance [phone]  [facility] Billing [phone]  Central Billing Office, [facility]: [phone]  [place] Billing [phone]  Medical Records [phone]  [place] Patient Bill of Rights [email or web address]       MENTAL HEALTH CRISIS NUMBERS:  For a medical emergency please call  911 or go to the nearest ER.     [facility]:   [facility] -[phone]   Crisis Residence [facility] Residence -[phone]   Walk-In Counseling Center [facility] -[phone]   COPE 24/7 [place] Mobile Team -[phone] (adults)/[phone] (child)  CHILD: [facility] needs assessment  team - [phone]      [facility]:   [facility] - [phone]   Walk-in counseling [facility] - [phone]   Walk-in counseling [facility] - [phone]   Crisis Residence [facility] Residence - [phone]  Urgent Care Adult Mental [place]-[phone] mobile unit/ 24/7 crisis line    National Crisis Numbers:   National Suicide Prevention Lifeline: 6-465-310-TALK (342-331-4740)  Poison Control Center - 2-088-636-9534  [employer]/resources for a list of additional resources (SOS)  Trans Lifeline a hotline for transgender people 1-320.153.4120  The [employer] Project a hotline for LGBT youth 6-617-265-5671  Crisis Text Line: For any crisis 24/7   To: 862837  see www.crisistextline.org  - IF MAKING A CALL FEELS TOO HARD, send a text!         Again thank you for choosing [facility] MENTAL HEALTH & ADDICTION [facility] and please let us know how we can best partner with you to improve you and your family's health.    You may be receiving a survey regarding this appointment. We would love to have your feedback, both positive and negative. The survey is done by an external company, so your answers are anonymous.

## 2021-09-13 DIAGNOSIS — F90.0 ADHD (ATTENTION DEFICIT HYPERACTIVITY DISORDER), INATTENTIVE TYPE: ICD-10-CM

## 2021-09-15 NOTE — TELEPHONE ENCOUNTER
Medication requested: GUANFACINE ER 2MG TABLETS  Last refilled: 8/19/21  Qty: 30      Last seen: 7/7/21  RTC: 6 months  Cancel: 0  No-show: 0  Next appt: 0    Refill decision:   Refill pended and routed to the provider for review/determination due to   No follow-up telephone note since last seen -need progress report  Last note states..  RECOMMENDATIONS:   1.  Guanfacine - increase to 1 mg bid for 2 weeks, if tolerated, change to Intuniv 2 mg q am  2.  Call with progress report  2.  Return to clinic in 6 months and earlier if needed

## 2021-09-16 RX ORDER — GUANFACINE 2 MG/1
2 TABLET, EXTENDED RELEASE ORAL EVERY MORNING
Qty: 30 TABLET | Refills: 3 | Status: SHIPPED | OUTPATIENT
Start: 2021-09-16 | End: 2022-01-06

## 2022-01-05 ENCOUNTER — VIRTUAL VISIT (OUTPATIENT)
Dept: PSYCHIATRY | Facility: CLINIC | Age: 27
End: 2022-01-05
Payer: COMMERCIAL

## 2022-01-05 DIAGNOSIS — F90.0 ADHD (ATTENTION DEFICIT HYPERACTIVITY DISORDER), INATTENTIVE TYPE: ICD-10-CM

## 2022-01-05 PROCEDURE — 99214 OFFICE O/P EST MOD 30 MIN: CPT | Mod: 95 | Performed by: PSYCHIATRY & NEUROLOGY

## 2022-01-05 ASSESSMENT — PAIN SCALES - GENERAL: PAINLEVEL: NO PAIN (0)

## 2022-01-05 NOTE — PATIENT INSTRUCTIONS
**For crisis resources, please see the information at the end of this document**   Patient Education    Thank you for coming to the [facility].    Lab Testing:  If you had lab testing today and your results are reassuring or normal they will be mailed to you or sent through [employer] within 7 days. If the lab tests need quick action we will call you with the results. The phone number we will call with results is # [phone] (home) . If this is not the best number please call our clinic and change the number.    Medication Refills:  If you need any refills please call your pharmacy and they will contact us. Our fax number for refills is [phone]. Please allow three business for refill processing. If you need to  your refill at a new pharmacy, please contact the new pharmacy directly. The new pharmacy will help you get your medications transferred.     Scheduling:  If you have any concerns about today's visit or wish to schedule another appointment please call our office during normal business hours [phone] (8-5:00 M-F)    Contact Us:  Please call [phone] during business hours (8-5:00 M-F).  If after clinic hours, or on the weekend, please call  [phone].    Financial Assistance [phone]  [facility] Billing [phone]  Central Billing Office, [facility]: [phone]  [facility] Billing [phone]  Medical Records [phone]  [facility] Patient Bill of Rights [email or web address]       MENTAL HEALTH CRISIS NUMBERS:  For a medical emergency please call  911 or go to the nearest ER.     [facility]:   [facility] -[phone]   Crisis Residence [facility] -[phone]   Walk-In Counseling Center [facility] -[phone]   COPE 24/7 [place] Mobile Team -[phone] (adults)/[phone] (child)  CHILD: [facility] needs assessment team - [phone]       [facility]:   [facility] - [phone]   Walk-in counseling [facility] - [phone]   Walk-in counseling [facility] - [phone]   Crisis Residence [facility] - [phone]  Urgent Care Adult Mental Bzswxm-[phone] mobile unit/ 24/7 crisis line    National Crisis Numbers:   National Suicide Prevention Lifeline: 7-924-871-TALK (325-076-9191)  Poison Control Center - 3-542-153-9491  [employer]/resources for a list of additional resources (SOS)  Trans Lifeline a hotline for transgender people 4-411-741-3476  The Kit Project a hotline for LGBT youth 1-573.788.1627  Crisis Text Line: For any crisis 24/7   To: 996768  see www.crisistextline.org  - IF MAKING A CALL FEELS TOO HARD, send a text!         Again thank you for choosing [facility] and please let us know how we can best partner with you to improve you and your family's health.    You may be receiving a survey regarding this appointment. We would love to have your feedback, both positive and negative. The survey is done by an external company, so your answers are anonymous.

## 2022-01-05 NOTE — PROGRESS NOTES
"VIDEO VISIT  Isak Drummond is a 25 year old patient who is being evaluated via a billable video visit.      The patient has been notified of following:   \"This video visit will be conducted via a call between you and your physician/provider. We have found that certain health care needs can be provided without the need for an in-person physical exam. This service lets us provide the care you need with a video conversation. If a prescription is necessary we can send it directly to your pharmacy. If lab work is needed we can place an order for that and you can then stop by our lab to have the test done at a later time. Insurers are generally covering virtual visits as they would in-office visits so billing should not be different than normal.  If for some reason you do get billed incorrectly, you should contact the billing office to correct it and that number is in the AVS .    Video Conference to be completed via:  [employer]    Patient has given verbal consent for video visit?:  Yes    Patient would prefer that any video invitations be sent by: Text to cell phone: [phone]      How would patient like to obtain AVS?:  Mail a copy    AVS SmartPhrase [PsychAVS] has been placed in 'Patient Instructions':  Yes     Vital signs: Not obtained due to video visit    History of present illness: Isak attends the appointment with his mother.  He is taking Intuniv 2 mg q day and a supplement (L-threonine 150 mg).  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      He continues to recover from ortho surgery (L posterior tib/fib repair).  He is able to walk but not yet able to run.    Mood is good.  Eating and sleeping OK.   Isak denies SI.  The future looks good.     Denies anxiety.    CURRENT MEDICATIONS:     Intuniv 2 mg daily      SIDE EFFECTS: none    MED ROS:  Serious knee (ligaments) and leg injury from fall while running.        MENTAL STATUS EXAMINATION:  Isak is casually dressed and groomed.  He maintains good eye " contact.  He answers questions intelligently.  His mood is neutral.  Affect is full range and appropriate.  Thinking is linear and goal-directed, associations are intact.  There is no psychosis.  There is no suicidal ideation.  Isak is oriented.  His recent and remote memory, attention, concentration, language and fund of knowledge are all within normal limits.  No speech or motor abnormalities.  Insight and judgment are good.       DIAGNOSES:   1.  ADHD, predominantly inattentive.   2.  History of mood disorder NOS.         RECOMMENDATIONS:   1.  Guanfacine ER 2 mg/day  2.  Return to clinic in 6 months and earlier if needed.     Video- Visit Details  Type of service:  video visit for medication management  Time of service:    Date:  01/05/2022    Video Start Time:  130 pm        Video End Time:  155 pm    Reason for video visit:  Patient unable to travel due to Covid-19  Originating Site (patient location):  [facility]   Location- Patient's home  Distant Site (provider location):  Remote location  Mode of Communication:  Video Conference via [employer]  Consent:  Patient has given verbal consent for video visit?: Yes     .Level of Medical Decision Making:   - At least 1 chronic problem that is not stable  - Engaged in prescription drug management during visit (discussed any medication benefits, side effects, alternatives, etc.)

## 2022-01-06 RX ORDER — GUANFACINE 2 MG/1
2 TABLET, EXTENDED RELEASE ORAL EVERY MORNING
Qty: 30 TABLET | Refills: 5 | Status: SHIPPED | OUTPATIENT
Start: 2022-01-06 | End: 2022-07-18

## 2022-07-18 DIAGNOSIS — F90.0 ADHD (ATTENTION DEFICIT HYPERACTIVITY DISORDER), INATTENTIVE TYPE: ICD-10-CM

## 2022-07-18 RX ORDER — GUANFACINE 2 MG/1
2 TABLET, EXTENDED RELEASE ORAL EVERY MORNING
Qty: 30 TABLET | Refills: 0 | Status: SHIPPED | OUTPATIENT
Start: 2022-07-18 | End: 2022-08-01

## 2022-07-18 NOTE — TELEPHONE ENCOUNTER
"Refill request received from: pharmacy    Last appointment: 1/5/2022    RTC: 6 months    Canceled appointments: 0    No Showed appointments: 0    Follow up scheduled: 0    Requested medication(s) (copy and paste last order information):   Disp Refills Start End NANI    guanFACINE (INTUNIV) 2 MG TB24 24 hr tablet 30 tablet 5 1/6/2022  No   Sig - Route: Take 1 tablet (2 mg) by mouth every morning - Oral   Sent to pharmacy as: guanFACINE HCl ER 2 MG Oral Tablet Extended Release 24 Hour (INTUNIV)   Class: E-Prescribe   Order: [id number]   E-Prescribing Status: Receipt confirmed by pharmacy (1/6/2022 12:48 PM CST)         Date medication last filled per outside med information: 6/21/2022 qty 30    Months of medication pended per MIDB refill protocol: 1    Request was sent to RNCC for approval    If patient is due for follow up \"Appointment required for further refills [phone]\" was placed in the sig of the medication and encounter was routed to scheduling pool to encourage follow up.     Medication pended by: Milagros Bean CMA      "

## 2022-08-01 ENCOUNTER — VIRTUAL VISIT (OUTPATIENT)
Dept: PSYCHIATRY | Facility: CLINIC | Age: 27
End: 2022-08-01
Payer: COMMERCIAL

## 2022-08-01 DIAGNOSIS — F90.0 ADHD (ATTENTION DEFICIT HYPERACTIVITY DISORDER), INATTENTIVE TYPE: ICD-10-CM

## 2022-08-01 PROCEDURE — 99442 PR PHYSICIAN TELEPHONE EVALUATION 11-20 MIN: CPT | Mod: 95 | Performed by: PSYCHIATRY & NEUROLOGY

## 2022-08-01 RX ORDER — GUANFACINE 2 MG/1
2 TABLET, EXTENDED RELEASE ORAL EVERY MORNING
Qty: 30 TABLET | Refills: 5 | Status: SHIPPED | OUTPATIENT
Start: 2022-08-15 | End: 2023-02-13

## 2022-08-01 NOTE — PROGRESS NOTES
Scheduled as video visit, but patient could not get video to work so appointment was switched to phone call.    Patient has given verbal consent for phone?:  Yes    Patient would prefer that any video invitations be sent by: Text to cell phone: [phone]      How would patient like to obtain AVS?:  Mail a copy    AVS SmartPhrase [PsychAVS] has been placed in 'Patient Instructions':  Yes     Vital signs: Not obtained due to phone visit    Works as data analysis remotely.  Challenging at times.  Manager is fine.    NO depression.  Denies SI.    Denies anxiety.  Denies PAs.    Current medication (Intuniv 2 mg/day) is effective for Isak's ADHD    Social life is same.  Sees friends.  Partially recovered from orthopedic surgery (L posterior tib/fib repair).  Can't run.  Enjoys canoeing and rowing.    SEs.  None    Complianace is good.    Health: fine.  Had COVID vaccines.    CURRENT MEDICATIONS:     Intuniv 2 mg daily      SIDE EFFECTS: none    MED ROS:  1 year ago: Serious knee (ligaments) and leg injury from fall while running.        MENTAL STATUS EXAMINATION: NA phone call since video did not work    DIAGNOSES:   1.  ADHD, predominantly inattentive.   2.  History of mood disorder NOS.         RECOMMENDATIONS:   1.  Guanfacine ER 2 mg/day  2.  Return to clinic in 6 months and earlier if needed.       Type of service:  Phone call for medication management  Time of service:    Date:  08/01/2022    Start Time:  200 pm        End Time:  216 pm      .Level of Medical Decision Making:   - At least 1 chronic problem that is not stable  - Engaged in prescription drug management during visit (discussed any medication benefits, side effects, alternatives, etc.)

## 2022-08-08 ENCOUNTER — VIRTUAL VISIT (OUTPATIENT)
Dept: PHARMACY | Facility: CLINIC | Age: 27
End: 2022-08-08
Payer: COMMERCIAL

## 2022-08-08 DIAGNOSIS — F98.8 ATTENTION DEFICIT DISORDER: Primary | ICD-10-CM

## 2022-08-08 DIAGNOSIS — T78.40XA ALLERGIES: ICD-10-CM

## 2022-08-08 PROCEDURE — 99605 MTMS BY PHARM NP 15 MIN: CPT | Performed by: PHARMACIST

## 2022-08-08 NOTE — PATIENT INSTRUCTIONS
"Recommendations from today's MTM visit:                                                      1. Singular reviewed today. Nothing we would need to do differently with it based on your Genesight report.   2. I will ask clinic to mail you a copy of your Genesight results, per your request.     It was great speaking with you today.  I value your experience and would be very thankful for your time in providing feedback in our clinic survey. In the next few days, you may receive an email or text message from [employer] with a link to a survey related to your  clinical pharmacist.\"     To schedule another MTM appointment, please call the clinic directly or you may call the MTM scheduling line at [phone] or toll-free at [phone].     My Clinical Pharmacist's contact information:                                                      Please feel free to contact me with any questions or concerns you have.      Selina Stover, PharmD, BCPP  Medication Therapy Management Pharmacist  [facility]     "

## 2022-08-08 NOTE — Clinical Note
Hello,  Thank you for the referral. I met with patient and mom today. No issues with Singulair and pt's Genesight results. Mom thankful for our discussion.   Thank you!  Selina

## 2023-02-13 DIAGNOSIS — F90.0 ADHD (ATTENTION DEFICIT HYPERACTIVITY DISORDER), INATTENTIVE TYPE: ICD-10-CM

## 2023-02-13 RX ORDER — GUANFACINE 2 MG/1
2 TABLET, EXTENDED RELEASE ORAL EVERY MORNING
Qty: 30 TABLET | Refills: 0 | Status: SHIPPED | OUTPATIENT
Start: 2023-02-13 | End: 2023-03-13

## 2023-02-13 NOTE — TELEPHONE ENCOUNTER
guanFACINE (INTUNIV) 2 MG  Last refilled: 8/15/22  Qty: 30 : 5    Last seen: 8/1/22  RTC: 6 MOS  Cancel: 0  No-show: 0  Next appt: NONE  30 day randal refill sent to the pharmacy - including instructions for patient to call the clinic and schedule an appointment.  Scheduling has been notified to contact the pt for appointment.

## 2023-03-13 DIAGNOSIS — F90.0 ADHD (ATTENTION DEFICIT HYPERACTIVITY DISORDER), INATTENTIVE TYPE: ICD-10-CM

## 2023-03-13 RX ORDER — GUANFACINE 2 MG/1
2 TABLET, EXTENDED RELEASE ORAL EVERY MORNING
Qty: 14 TABLET | Refills: 0 | Status: SHIPPED | OUTPATIENT
Start: 2023-03-13 | End: 2023-03-15

## 2023-03-13 NOTE — TELEPHONE ENCOUNTER
guanFACINE (INTUNIV) 2 MG  Last refilled: 2/13/23  Qty: 30    Last seen: 8/1/22  RTC: 6 MOS  Cancel: 0  No-show: 0  Next appt: NONE  14 day randal refill sent to the pharmacy - including instructions for patient to call the clinic and schedule an appointment.  Scheduling has been notified to contact the pt for appointment.

## 2023-03-15 ENCOUNTER — VIRTUAL VISIT (OUTPATIENT)
Dept: PSYCHIATRY | Facility: CLINIC | Age: 28
End: 2023-03-15
Payer: COMMERCIAL

## 2023-03-15 DIAGNOSIS — F90.0 ADHD (ATTENTION DEFICIT HYPERACTIVITY DISORDER), INATTENTIVE TYPE: ICD-10-CM

## 2023-03-15 PROCEDURE — 99441 PR PHYSICIAN TELEPHONE EVALUATION 5-10 MIN: CPT | Mod: VID | Performed by: PSYCHIATRY & NEUROLOGY

## 2023-03-15 RX ORDER — GUANFACINE 2 MG/1
2 TABLET, EXTENDED RELEASE ORAL DAILY
Qty: 90 TABLET | Refills: 1 | Status: SHIPPED | OUTPATIENT
Start: 2023-03-15 | End: 2023-09-05

## 2023-03-15 NOTE — PROGRESS NOTES
Isak Drummond is a 27 year old male who is being evaluated via a billable video visit.        How would you like to obtain your AVS? by Mail  Primary method for receiving video invitation: Text to cell phone: [phone]  If the video visit is dropped, the invitation should be resent by: N/A  Will anyone else be joining your video visit? No      Type of service:  Video Visit      Scheduled as video visit, but patient could not get video to work so appointment was switched to phone call.    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  He has no concerns about his job.    Isak lives at home with his parents.  He sees his friends.  For exercise, he has been going to the gym and skiing.    He reports that his health is good.  He continues to recover from surgery (L posterior tib/fib repair) for an orthopedic injury ~1.5 years ago.    Isak states his mood is good.  He denies depressive symptoms.    He denies worries.    His mother expresses concern about Isak going to bed too late.  He goes to bed at 1-130 am.  He gets up around 830 am.  Isak does not view this as a problem and feels he is getting enough sleep.    Current medication (Intuniv 2 mg/day) is effective for Isak's ADHD      SEs.  None reported.  Denies sedation or activation from Intuniv.    Complianace is good.    CURRENT MEDICATIONS:     Intuniv 2 mg daily    MED ROS:  1 year ago: Serious knee (ligaments) and leg injury from fall while running.        MENTAL STATUS EXAMINATION: NA phone call since video did not work    DIAGNOSES:     1.  ADHD, predominantly inattentive.   2.  Mood disorder unspecified  - in remission        RECOMMENDATIONS:   1.  Guanfacine ER 2 mg/day  2.  Return to clinic in 6 months and earlier if needed.       Type of service:  Phone call for medication management  Time of service:    Date:  03/15/2023    Start Time:  212 pm        End Time:  220 pm      .Level of Medical Decision Making:   - At least 1 chronic problem that is not stable  - Engaged in prescription drug management during visit (discussed any medication benefits, side effects, alternatives, etc.)

## 2023-03-15 NOTE — PATIENT INSTRUCTIONS
**For crisis resources, please see the information at the end of this document**   Patient Education    Thank you for coming to the [facility].     Lab Testing:  If you had lab testing today and your results are reassuring or normal they will be mailed to you or sent through [employer] within 7 days. If the lab tests need quick action we will call you with the results. The phone number we will call with results is # [phone]. If this is not the best number please call our clinic and change the number.     Medication Refills:  If you need any refills please call your pharmacy and they will contact us. Our fax number for refills is [phone].   Three business days of notice are needed for general medication refill requests.   Five business days of notice are needed for controlled substance refill requests.   If you need to change to a different pharmacy, please contact the new pharmacy directly. The new pharmacy will help you get your medications transferred.     Contact Us:  Please call [phone] during business hours (8-5:00 M-F).   If you have medication related questions after clinic hours, or on the weekend, please call [phone].     Financial Assistance [phone]   Medical Records [phone]       MENTAL HEALTH CRISIS RESOURCES:  For a emergency help, please call 911 or go to the nearest Emergency Department.     Emergency Walk-In Options:   EmPATH Unit @ [facility] ([place]): [phone] - Specialized mental health emergency area designed to be calming  [facility] ([place]): [phone]  [facility] ([place]): [phone]  [facility]): [phone]    [facility] Crisis Information:   [place]: [phone]  [place]: [phone]  [place] (BESSY) - Adult: [phone]     Child: [phone]  [place] - Adult: [phone]     Child: [phone]  [place]: [phone]  List of all [place]  [facility] resources:   https://[place].gov/dhs/people-we-serve/adults/health-care/mental-health/resources/crisis-contacts.jsp    National Crisis Information:   Crisis Text Line: Text  [place]  to 584384  Suicide & Crisis Lifeline: 988  National Suicide Prevention Lifeline: 5-813-591-TALK (5-259-440-0221)       For online chat options, visit https://suicidepreventionlifeline.org/chat/  Poison Control Center: 8-599-173-4559  Trans Lifeline: 1-744-746-2666 - Hotline for transgender people of all ages  The Kit Project: 6-436-435-7805 - Hotline for LGBT youth     For Non-Emergency Support:   Fast Tracker: Mental Health & Substance Use Disorder Resources -   https://www.YouSciencen.org/

## 2023-09-05 DIAGNOSIS — F90.0 ADHD (ATTENTION DEFICIT HYPERACTIVITY DISORDER), INATTENTIVE TYPE: ICD-10-CM

## 2023-09-05 RX ORDER — GUANFACINE 2 MG/1
2 TABLET, EXTENDED RELEASE ORAL DAILY
Qty: 30 TABLET | Refills: 0 | Status: SHIPPED | OUTPATIENT
Start: 2023-09-05 | End: 2023-09-11

## 2023-09-05 NOTE — TELEPHONE ENCOUNTER
"Refill request received from: pharmacy    Last appointment: 3/15/2023    RTC: 6 months    Canceled appointments: 0    No Showed appointments: 0    Follow up scheduled: 9/11/2023    Requested medication(s) (copy and paste last order information):    Disp Refills Start End NANI   guanFACINE (INTUNIV) 2 MG TB24 24 hr tablet 90 tablet 1 3/15/2023  No   Sig - Route: Take 1 tablet (2 mg) by mouth daily - Oral   Sent to pharmacy as: guanFACINE HCl ER 2 MG Oral Tablet Extended Release 24 Hour (INTUNIV)   Class: E-Prescribe   Notes to Pharmacy: Please cancel prescription for Intuniv 2 mg, 1 tablet daily, #14.   Order: [id number]   E-Prescribing Status: Receipt confirmed by pharmacy (3/15/2023  3:37 PM CDT)       Date medication last filled per outside med information: 6/8/2023 for 90 d/s    Months of medication pended per MIDB refill protocol: 1    Request was sent to RNCC Pool for approval    If patient is due for follow up \"Appointment required for further refills [phone]\" was placed in the sig of the medication and encounter was routed to scheduling pool to encourage follow up.     Medication pended by: Annalisa Suazo CMA    "

## 2023-09-11 ENCOUNTER — VIRTUAL VISIT (OUTPATIENT)
Dept: PSYCHIATRY | Facility: CLINIC | Age: 28
End: 2023-09-11
Payer: COMMERCIAL

## 2023-09-11 DIAGNOSIS — F90.0 ADHD (ATTENTION DEFICIT HYPERACTIVITY DISORDER), INATTENTIVE TYPE: Primary | ICD-10-CM

## 2023-09-11 PROCEDURE — 99214 OFFICE O/P EST MOD 30 MIN: CPT | Mod: VID | Performed by: PSYCHIATRY & NEUROLOGY

## 2023-09-11 RX ORDER — GUANFACINE 3 MG/1
3 TABLET, EXTENDED RELEASE ORAL DAILY
Qty: 30 TABLET | Refills: 1 | Status: SHIPPED | OUTPATIENT
Start: 2023-09-11 | End: 2023-11-08

## 2023-09-11 ASSESSMENT — PAIN SCALES - GENERAL: PAINLEVEL: NO PAIN (0)

## 2023-09-11 NOTE — PROGRESS NOTES
Isak Drummond is a 27 year old male who is being evaluated via a billable video visit.        How would you like to obtain your AVS? by Mail  Primary method for receiving video invitation: Text to cell phone: [phone]  If the video visit is dropped, the invitation should be resent by: N/A  Will anyone else be joining your video visit? No      Type of service:  Video Visit      Scheduled as video visit, but patient could not get video to work so appointment was switched to phone call.    Patient has given verbal consent for phone?:  Yes    Patient would prefer that any video invitations be sent by: Text to cell phone: [phone]      How would patient like to obtain AVS?:  Mail a copy    AVS SmartPhrase [PsychAVS] has been placed in 'Patient Instructions':  Yes     Vital signs: Not obtained due to phone visit    Works on data analysis remotely.      He had an internship over the summer at [employer] which he liked and now he has enrolled at [employer] to get another degree - probably in [place].  He is taking chemistry, pre-calc, and anatomy.  DUe to the additional demands on his schedule (courses and job), he is having more trouble with ADHD symptoms.  He would like to increase the Intuniv from 2 mg to 3 mg.   He does not have any SEs from current dose of Intuniv.    Mood is good and sleep is OK.  His energy is low.  He keeps busy with swimming and canoeing.  He denies SI.    He contracted covid from his father ~5 days ago.  He has moderate respiratory symptoms.      He is unable to run due to injury and recovery from surgery (L posterior tib/fib repair) ~2 years ago.    Current medication (Intuniv 2 mg/day) is effective for Isak's ADHD    SEs.  None reported.     Complianace is good.    CURRENT MEDICATIONS:     Intuniv 2 mg daily    MED ROS:  2 years ago: Serious knee (ligaments) and leg injury from fall while running.        MENTAL STATUS EXAMINATION: NA phone call since video did not work     DIAGNOSES:     1.  ADHD, predominantly inattentive.   2.  Mood disorder unspecified - in remission        RECOMMENDATIONS:   1.  Increase Guanfacine ER to 3 mg/day  2.  Return to clinic in 6 months and earlier if needed.       Type of service:  Phone call for medication management  Time of service:  Date:  09/11/2023  Start Time:  131 pm        End Time:  146 pm      .Level of Medical Decision Making:   - At least 1 chronic problem that is not stable  - Engaged in prescription drug management during visit (discussed any medication benefits, side effects, alternatives, etc.)

## 2023-09-11 NOTE — NURSING NOTE
Is the patient currently in the state of [place]? YES    Visit mode:VIDEO    If the visit is dropped, the patient can be reconnected by: VIDEO VISIT: Text to cell phone:   Telephone Information:   Mobile [phone]       Will anyone else be joining the visit? Mom will be there  (If patient encounters technical issues they should call [phone])    How would you like to obtain your AVS? MyChart    Are changes needed to the allergy or medication list? No    Reason for visit: No chief complaint on file.    Shelbi COTEF

## 2023-10-02 ENCOUNTER — VIRTUAL VISIT (OUTPATIENT)
Dept: PSYCHIATRY | Facility: CLINIC | Age: 28
End: 2023-10-02
Payer: COMMERCIAL

## 2023-10-02 DIAGNOSIS — F90.0 ADHD (ATTENTION DEFICIT HYPERACTIVITY DISORDER), INATTENTIVE TYPE: Primary | ICD-10-CM

## 2023-10-02 PROCEDURE — 99443 PR PHYSICIAN TELEPHONE EVALUATION 21-30 MIN: CPT | Mod: 95 | Performed by: PSYCHIATRY & NEUROLOGY

## 2023-10-02 RX ORDER — METHYLPHENIDATE HYDROCHLORIDE 18 MG/1
18 TABLET ORAL EVERY MORNING
Qty: 30 TABLET | Refills: 0 | Status: SHIPPED | OUTPATIENT
Start: 2023-10-02 | End: 2023-10-18

## 2023-10-02 NOTE — NURSING NOTE
Is the patient currently in the state of [place]? YES    Visit mode:VIDEO    If the visit is dropped, the patient can be reconnected by: VIDEO VISIT: Text to cell phone:   Telephone Information:   Mobile [phone]       Will anyone else be joining the visit? Yes, mom will join visit  (If patient encounters technical issues they should call [phone])    How would you like to obtain your AVS? MyChart    Are changes needed to the allergy or medication list? N/A    Reason for visit: RECHECK    Annemarie DAVE

## 2023-10-02 NOTE — PROGRESS NOTES
"      Scheduled as video visit, but patient could not get video to work so appointment was switched to phone call.        Vital signs: Not obtained due to phone visit    The increase in Intuniv to 3 mg was only minimally helpful.  He continues to struggle with inattentive symptoms of ADHD.  He has lots of work related to his courses at [employer] and in not able to focus.  He continues to be distractible.      Isak reports that he can only focus briefly and he gets \"attention fatigue\".    We reviewed previous med trials for ADHD.  He would like to try methylphenidate ER.    Works on data analysis remotely.      He is unable to run due to injury and recovery from surgery (L posterior tib/fib repair) ~2 years ago.    Current medication (Intuniv 3 mg/day)     SEs.  None reported.     Complianace is good.    MED ROS:  2 years ago: Serious knee (ligaments) and leg injury from fall while running.        MENTAL STATUS EXAMINATION: NA phone call since video did not work    DIAGNOSES:     1.  ADHD, predominantly inattentive.   2.  Mood disorder unspecified - in remission        RECOMMENDATIONS:   1.  Continue Guanfacine ER 3 mg/day  2.  Concerta 18 mg/day risks and benefits reviewed and Isak agree to the medication trial.  2.  Return to clinic in 1-2 months and earlier if needed.       Type of service:  Phone call for medication management  Time of service:  Date:  10/02/2023  Start Time:  345 pm        End Time:  408 pm      .Level of Medical Decision Making:   - At least 1 chronic problem that is not stable  - Engaged in prescription drug management during visit (discussed any medication benefits, side effects, alternatives, etc.)          "

## 2023-10-12 ENCOUNTER — TELEPHONE (OUTPATIENT)
Dept: PSYCHIATRY | Facility: CLINIC | Age: 28
End: 2023-10-12

## 2023-10-12 NOTE — TELEPHONE ENCOUNTER
[facility] Call Center    Phone Message    May a detailed message be left on voicemail: yes     Reason for Call: Medication Question or concern regarding medication   Prescription Clarification  Name of Medication: methylphenidate HCl ER, OSM, (CONCERTA) 18 MG CR tablet   Prescribing Provider: Mary Sorenson MD    Pharmacy:   [id number] [place], [place] - [facility] [address] AT [facility] [address]      What on the order needs clarification? Mom called to provide update on medication. They stated that Pt had not seen any improvement on the 18 MG and increased dose to 36 MG (two tablets) for a week, and then increased the dose to 54 MG (3 tablets) for the last two days and have seen improvements. They stated that Pt now has around 4-5 days left of the medication if they continue the 54 MG dose. They said that Pt may be looking to increase to 72 MG (4 pills) so if a request is put in the 18 MG tablets may be the preferred dosage. They would like to discuss the improvements seen as well as direction on increasing the dosage. Mom requested that she be called back as Pt is unavailable to talk during the daytime.       Action Taken: Other: p midb psychiatry    Travel Screening: Not Applicable

## 2023-10-12 NOTE — TELEPHONE ENCOUNTER
Per most recent visit note:  Concerta 18 mg/day risks and benefits reviewed and Isak agree to the medication trial.    Patient has been taking 54 mg daily and requesting to increase to 72 mg despite provider instruction to take 18 mg daily. Routed to provider for guidance.

## 2023-10-18 ENCOUNTER — VIRTUAL VISIT (OUTPATIENT)
Dept: PSYCHIATRY | Facility: CLINIC | Age: 28
End: 2023-10-18
Payer: COMMERCIAL

## 2023-10-18 DIAGNOSIS — F90.0 ADHD (ATTENTION DEFICIT HYPERACTIVITY DISORDER), INATTENTIVE TYPE: Primary | ICD-10-CM

## 2023-10-18 PROCEDURE — 99443 PR PHYSICIAN TELEPHONE EVALUATION 21-30 MIN: CPT | Mod: 95 | Performed by: PSYCHIATRY & NEUROLOGY

## 2023-10-18 RX ORDER — METHYLPHENIDATE HYDROCHLORIDE 54 MG/1
54 TABLET ORAL EVERY MORNING
Qty: 30 TABLET | Refills: 0 | Status: SHIPPED | OUTPATIENT
Start: 2023-10-18 | End: 2023-11-08

## 2023-10-18 NOTE — PATIENT INSTRUCTIONS
**For crisis resources, please see the information at the end of this document**   Patient Education    Thank you for coming to the [facility].     Lab Testing:  If you had lab testing today and your results are reassuring or normal they will be mailed to you or sent through [employer] within 7 days. If the lab tests need quick action we will call you with the results. The phone number we will call with results is # [phone]. If this is not the best number please call our clinic and change the number.     Medication Refills:  If you need any refills please call your pharmacy and they will contact us. Our fax number for refills is [phone].   Three business days of notice are needed for general medication refill requests.   Five business days of notice are needed for controlled substance refill requests.   If you need to change to a different pharmacy, please contact the new pharmacy directly. The new pharmacy will help you get your medications transferred.     Contact Us:  Please call [phone] during business hours (8-5:00 M-F).   If you have medication related questions after clinic hours, or on the weekend, please call [phone].     Financial Assistance [phone]   Medical Records [phone]       MENTAL HEALTH CRISIS RESOURCES:  For a emergency help, please call 911 or go to the nearest Emergency Department.     Emergency Walk-In Options:   EmPATH Unit @ [facility] ([place]): [phone] - Specialized mental health emergency area designed to be calming  [facility] ([place]): [phone]  [facility] ([place]): [phone]  [facility]): [phone]    [facility] Crisis Information:   [place]: [phone]  [place]: [phone]  [place] (BESSY) - Adult: [phone]     Child: [phone]  [place] - Adult: [phone]     Child: [phone]  [place]: [phone]  List of all [place]  [facility] resources:   https://[place].gov/dhs/people-we-serve/adults/health-care/mental-health/resources/crisis-contacts.jsp    National Crisis Information:   Crisis Text Line: Text  [place]  to 521068  Suicide & Crisis Lifeline: 988  National Suicide Prevention Lifeline: 3-450-218-TALK (5-219-004-5862)       For online chat options, visit https://suicidepreventionlifeline.org/chat/  Poison Control Center: 1-863-737-5013  Trans Lifeline: 7-931-429-1181 - Hotline for transgender people of all ages  The Kit Project: 4-654-849-0861 - Hotline for LGBT youth     For Non-Emergency Support:   Fast Tracker: Mental Health & Substance Use Disorder Resources -   https://www.[place].org/

## 2023-10-18 NOTE — PROGRESS NOTES
"Isak Drummond is a 28 year old who is being evaluated via a billable video visit.      Pt will join video visit via: [employer]  If there are problems joining the visit, send backup video invite via: Text to preferred phone: [phone]    Originating location (patient location): Patient's home    Will anyone else be joining the visit? Yes, mother    Isak took 36 mg for 1-2 days, then 54 mg for     No side effects except possible delayed sleep onset.      Patient was prescribed Concerta 18 mg/day which he took for several days with limited benefit, then he increased to 36 mg and noticed some decrease in restlessness and improved focus but it was not adequate.  Thus he increased to 54 mg with noticeable improvement.  I received a message  on 10/12/23 as follows:   Patient has been taking 54 mg daily and requesting to increase to 72 mg despite provider instruction to take 18 mg daily. Routed to provider for guidance.     Today Isak states that he Takes the Concerta at  8-10 am.  Seems to kick in quickly within 20 min.  Takes it with food.  Inadequate benefit on the initial dose of 18 mg, he was able to feel a bit of improvement on 18 mg.  On Friday Oct 6, he took 36 mg and reported that he felt less restless, more sustained focus.  It was not optimal, and there was room for improvement.  Thus he increased it to 54 mg.  He further stated, \"There is a lot of variation in my work load.\"  When he thinks he is going to be busy, he takes a higher dose.      Prior to starting the stimulant, he had a sleep latency of 45-60 min.  He is unsure if he is taking longer to fall asleep now.  He will monitor this.      Isak denies appetite suppression, increase in HR, tics, dysphoria.  He states he has no side effects on the 54 mg of Concerta.    Patient and his mother were informed by me today that he should not take a higher dose of the medication than I prescribe.  He was also told that the next appointment needs to be in " person to monitor vitals and weight.    Patient agrees with the plan.    Current medication (Intuniv 3 mg/day) Patient has been taking 18-54 mg/day of Concerta.     SEs.  None reported.     Complianace is good.    MED ROS:  2 years ago: Serious knee (ligaments) and leg injury from fall while running.        MENTAL STATUS EXAMINATION: NA phone call since video did not work    DIAGNOSES:     1.  ADHD, predominantly inattentive.   2.  Mood disorder unspecified - in remission        RECOMMENDATIONS:   1.  Continue Guanfacine ER 3 mg/day  2.  Concerta 54 mg/day  2.  Return to clinic in person on 11/8/23 at 2 pm.       Type of service:  Phone call for medication management  Time of service:  Date:  10/18/2023  Start Time:  435 pm        End Time:  510 pm        .Level of Medical Decision Making:   - At least 1 chronic problem that is not stable  - Engaged in prescription drug management during visit (discussed any medication benefits, side effects, alternatives, etc.)

## 2023-11-08 ENCOUNTER — OFFICE VISIT (OUTPATIENT)
Dept: PSYCHIATRY | Facility: CLINIC | Age: 28
End: 2023-11-08
Payer: COMMERCIAL

## 2023-11-08 VITALS — HEART RATE: 85 BPM | DIASTOLIC BLOOD PRESSURE: 74 MMHG | WEIGHT: 127.2 LBS | SYSTOLIC BLOOD PRESSURE: 129 MMHG

## 2023-11-08 DIAGNOSIS — F90.0 ADHD (ATTENTION DEFICIT HYPERACTIVITY DISORDER), INATTENTIVE TYPE: ICD-10-CM

## 2023-11-08 PROCEDURE — 99215 OFFICE O/P EST HI 40 MIN: CPT | Performed by: PSYCHIATRY & NEUROLOGY

## 2023-11-08 RX ORDER — METHYLPHENIDATE HYDROCHLORIDE 54 MG/1
54 TABLET ORAL EVERY MORNING
Qty: 30 TABLET | Refills: 0 | Status: SHIPPED | OUTPATIENT
Start: 2023-12-13 | End: 2024-01-26

## 2023-11-08 RX ORDER — METHYLPHENIDATE HYDROCHLORIDE 10 MG/1
TABLET ORAL
Qty: 45 TABLET | Refills: 0 | Status: SHIPPED | OUTPATIENT
Start: 2023-11-08 | End: 2023-12-12

## 2023-11-08 RX ORDER — METHYLPHENIDATE HYDROCHLORIDE 54 MG/1
54 TABLET ORAL EVERY MORNING
Qty: 30 TABLET | Refills: 0 | Status: SHIPPED | OUTPATIENT
Start: 2023-11-15 | End: 2023-12-27

## 2023-11-08 NOTE — PATIENT INSTRUCTIONS
**For crisis resources, please see the information at the end of this document**   Patient Education    Thank you for coming to the [facility].     Lab Testing:  If you had lab testing today and your results are reassuring or normal they will be mailed to you or sent through [employer]) within 7 days. If the lab tests need quick action we will call you with the results. The phone number we will call with results is # [phone]. If this is not the best number please call our clinic and change the number.     Medication Refills:  If you need any refills please call your pharmacy and they will contact us. Our fax number for refills is [phone].   Three business days of notice are needed for general medication refill requests.   Five business days of notice are needed for controlled substance refill requests.   If you need to change to a different pharmacy, please contact the new pharmacy directly. The new pharmacy will help you get your medications transferred.     Contact Us:  Please call [phone] during business hours (8-5:00 M-F).   If you have medication related questions after clinic hours, or on the weekend, please call [phone].     Financial Assistance [phone]   Medical Records [phone]       MENTAL HEALTH CRISIS RESOURCES:  For a emergency help, please call 911 or go to the nearest Emergency Department.     Emergency Walk-In Options:   EmPATH Unit @ [facility] ([place]): [phone] - Specialized mental health emergency area designed to be calming  [facility] ([place]): [phone]  [facility] ([place]): [phone]  [facility]): [phone]    [facility] Crisis Information:   [place]: [phone]  [place]: [phone]  [place] (BESSY) - Adult: [phone]     Child: [phone]  [place] - Adult: [phone]     Child: [phone]  [place]: [phone]  List of all [place]  [facility] resources:   https://[place].gov/dhs/people-we-serve/adults/health-care/mental-health/resources/crisis-contacts.jsp    National Crisis Information:   Crisis Text Line: Text  [place]  to 587631  Suicide & Crisis Lifeline: 988  National Suicide Prevention Lifeline: 9-485-906-TALK (6-618-941-9538)       For online chat options, visit https://suicidepreventionlifeline.org/chat/  Poison Control Center: 9-525-435-8008  Trans Lifeline: 4-895-305-9152 - Hotline for transgender people of all ages  The Kit Project: 9-316-288-3400 - Hotline for LGBT youth     For Non-Emergency Support:   Fast Tracker: Mental Health & Substance Use Disorder Resources -   https://www.[place].org/

## 2023-11-08 NOTE — PROGRESS NOTES
"Isak Drummond is a 28 year old who is being evaluated via an in person appointment.  He is accompanied by both parents.    He has been taking Concerta 54 mg with some benefit.  He is wondering about a higher dose.  He does not appreciated dramatic improvement.  He still is having trouble getting his work done.He reports having \"focus fatigue\" during which he can't focus for long periods of time.  He still experiences some distractibility.  He said that his focus in lacking in the evening.      Takes med at 830-9 am, wears off by 5-6 pm.  Mouth is  little dry. No other side effects    Taking 3 classes pre calc, chem, anatomy.  Classes are a stuggle.      Discussed the options for medication changes.  Since the Concerta wears off by 5-6 pm, I suggested booster dose of methylphenidate IR 10-20 mg at 4- 6 pm.  He will determine if this interferes with falling asleep.  If it does, then he should take it earlier in the afternoon.  Will monitor benefits.  If necessary, will consider increase in am Concerta dose at a future appointment.    Current medication (Intuniv 3 mg/day) Patient has been taking 54 mg/day of Concerta.     SEs.  None reported.     Complianace is good.    MED ROS:  2 years ago: Serious knee (ligaments) and leg injury from fall while running.        VITALS:  BP: 129/74, P: 85, Weight: 127#    MENTAL STATUS EXAMINATION:  Isak is casually dressed and groomed.  He maintains good eye contact.  He answers questions intelligently.  His mood is neutral.  Affect is full range and appropriate.  Thinking is linear and goal-directed, associations are intact.  There is no psychosis.  There is no suicidal ideation.  Isak is oriented.  His recent and remote memory, attention, concentration, language and fund of knowledge are all within normal limits.  No speech or motor abnormalities.  Insight and judgment are good.           DIAGNOSES:     1.  ADHD, predominantly inattentive.   2.  Mood disorder unspecified - in " remission        RECOMMENDATIONS:   1.  Continue Guanfacine ER 3 mg/day  2.  Concerta 54 mg/day  3.  Methylphenidate 10-20 mg at 5 pm for a booster dose.  2.  Return to clinic in 3-4 weeks.      Type of service:  in person  Time of service:  Date:  11/08/2023  Start Time:  202 pm        End Time:   254 pm        .Level of Medical Decision Making:   - At least 1 chronic problem that is not stable  - Engaged in prescription drug management during visit (discussed any medication benefits, side effects, alternatives, etc.)

## 2023-11-08 NOTE — NURSING NOTE
Chief Complaint   Patient presents with    Recheck Medication       /74 (BP Location: Right arm, Patient Position: Sitting, Cuff Size: Adult Regular)   Pulse 85   Wt 127 lb 3.2 oz (57.7 kg)     Kindra Rizzo LPN  November 8, 2023

## 2023-11-09 RX ORDER — GUANFACINE 3 MG/1
3 TABLET, EXTENDED RELEASE ORAL DAILY
Qty: 30 TABLET | Refills: 3 | Status: SHIPPED | OUTPATIENT
Start: 2023-11-09 | End: 2024-01-26

## 2023-11-14 ENCOUNTER — TELEPHONE (OUTPATIENT)
Dept: PSYCHIATRY | Facility: CLINIC | Age: 28
End: 2023-11-14

## 2023-11-14 NOTE — TELEPHONE ENCOUNTER
My Chart Proxy Access Request forms were received from parent, they were printed off in clinic and are awaiting provider signature/completion.

## 2023-11-19 ENCOUNTER — HEALTH MAINTENANCE LETTER (OUTPATIENT)
Age: 28
End: 2023-11-19

## 2023-12-06 ENCOUNTER — VIRTUAL VISIT (OUTPATIENT)
Dept: PSYCHIATRY | Facility: CLINIC | Age: 28
End: 2023-12-06
Payer: COMMERCIAL

## 2023-12-06 DIAGNOSIS — F90.0 ADHD (ATTENTION DEFICIT HYPERACTIVITY DISORDER), INATTENTIVE TYPE: Primary | ICD-10-CM

## 2023-12-06 PROCEDURE — 99214 OFFICE O/P EST MOD 30 MIN: CPT | Mod: VID | Performed by: PSYCHIATRY & NEUROLOGY

## 2023-12-06 ASSESSMENT — PAIN SCALES - GENERAL: PAINLEVEL: NO PAIN (0)

## 2023-12-06 NOTE — PROGRESS NOTES
Virtual Visit Details    Type of service:  Video Visit       Originating Location (pt. Location): Home    Distant Location (provider location):  On-site  Platform used for Video Visit: Mary

## 2023-12-06 NOTE — NURSING NOTE
Is the patient currently in the state of [place]? YES    Visit mode:VIDEO    If the visit is dropped, the patient can be reconnected by: VIDEO VISIT: Text to cell phone:   Telephone Information:   Mobile [phone]       Will anyone else be joining the visit? No  (If patient encounters technical issues they should call [phone])    How would you like to obtain your AVS? MyChart    Are changes needed to the allergy or medication list? Pt stated no changes to allergies and Pt stated no med changes    Rooming Documentation: Assigned questionnaire(s) completed .    Reason for visit: TENZIN Jimenez

## 2023-12-06 NOTE — PROCEDURES
"Isak Drummond is a 28 year old who is being evaluated via video appointment.  He is accompanied by his mother.    He has been taking Concerta 54 mg with some benefit.  He tried adding methylphenidate 10 mg at 5 pm about 7 x and felt it was helpful.  If he takes it later, it may interfere with sleep.    Overall, the stimulant has been helpful.  He has an exam tomorrow and another exam in a week.  He thinks his grades will be low Cs. Taking 3 classes pre calc, chem, anatomy.     Isak denies side effects except for insomnia with taking the medication too late in the day.  He denies appetite suppression, tics, and dysphoria.      Isak reports that his hands have been \"colder\" sometimes.  Mother recalls that his brother has a history of Reynauds.  She has been monitoring Addies BP with the following findings:  12/5/23 730 pm BP: 132/71 (after taking 54 mg plus 10 mg methylphenidate, 12/5/23 later in the day BP: 127/69.    Will try adding methylphenidate 10 mg in the morning to Concerta 54 mg in the morning.    Current medication (Intuniv 3 mg/day) Patient has been taking 54 mg/day of Concerta and methylphenidate 10 mg at 5 pm PRN.     SEs.  HPI     Complianace is good.    MED ROS:  2 years ago: Serious knee (ligaments) and leg injury from fall while running.        VITALS (last visit):  BP: 129/74, P: 85, Weight: 127#    MENTAL STATUS EXAMINATION:  Isak is casually dressed and groomed.  He maintains good eye contact.  He answers questions intelligently.  His mood is neutral.  Affect is full range and appropriate.  Thinking is linear and goal-directed, associations are intact.  There is no psychosis.  There is no suicidal ideation.  Isak is oriented.  His recent and remote memory, attention, concentration, language and fund of knowledge are all within normal limits.  No speech or motor abnormalities.  Insight and judgment are good.           DIAGNOSES:     1.  ADHD, predominantly inattentive.   2.  Mood " disorder unspecified - in remission        RECOMMENDATIONS:   1.  Continue Guanfacine ER 3 mg/day  2.  Concerta 54 mg/day  3.  Methylphenidate 10-20 mg at 5 pm for a booster dose.  4/  Add Methylphenidate 10 mg in the am.  5.  Send My Chart message on 12/11/23 about new dose of stimulant and will adjust as indicated.  6. RTC: 12/27/23 at 3 pm for video appointment 30 min.        Type of service:  video  Time of service:  Date:  12/06/2023  Start Time:  330 pm        End Time:    407 pm        .Level of Medical Decision Making:   - At least 1 chronic problem that is not stable  - Engaged in prescription drug management during visit (discussed any medication benefits, side effects, alternatives, etc.)

## 2023-12-11 ENCOUNTER — MYC MEDICAL ADVICE (OUTPATIENT)
Dept: PSYCHIATRY | Facility: CLINIC | Age: 28
End: 2023-12-11

## 2023-12-11 DIAGNOSIS — F90.0 ADHD (ATTENTION DEFICIT HYPERACTIVITY DISORDER), INATTENTIVE TYPE: ICD-10-CM

## 2023-12-11 NOTE — TELEPHONE ENCOUNTER
Mary,     Recommended dosing at last appointment is going well for patient.  Ok for him to continue on Concerta 54mg + Ritalin 10mg every morning and Ritalin 10mg at 4:30pm everyday until next check in on 12/27?    Thanks, Khadijah ([place]ia back on Wednesday)

## 2023-12-12 RX ORDER — METHYLPHENIDATE HYDROCHLORIDE 10 MG/1
10 TABLET ORAL 2 TIMES DAILY
Qty: 45 TABLET | Refills: 0 | Status: SHIPPED | OUTPATIENT
Start: 2023-12-12 | End: 2024-01-26

## 2023-12-12 NOTE — TELEPHONE ENCOUNTER
Mary,     If you are ok with patient continuing with BID 10mg dosing of immediate release methylphenidate until he sees you on 12/27, he will need a refill prior to that appointment.  Please sign if you approve.    Khadijah

## 2023-12-27 ENCOUNTER — VIRTUAL VISIT (OUTPATIENT)
Dept: PSYCHIATRY | Facility: CLINIC | Age: 28
End: 2023-12-27
Payer: COMMERCIAL

## 2023-12-27 DIAGNOSIS — F90.0 ADHD (ATTENTION DEFICIT HYPERACTIVITY DISORDER), INATTENTIVE TYPE: Primary | ICD-10-CM

## 2023-12-27 PROCEDURE — 99214 OFFICE O/P EST MOD 30 MIN: CPT | Mod: VID | Performed by: PSYCHIATRY & NEUROLOGY

## 2023-12-27 RX ORDER — METHYLPHENIDATE HYDROCHLORIDE 54 MG/1
54 TABLET ORAL EVERY MORNING
Qty: 30 TABLET | Refills: 0 | Status: SHIPPED | OUTPATIENT
Start: 2024-02-01 | End: 2024-03-18

## 2023-12-27 RX ORDER — METHYLPHENIDATE HYDROCHLORIDE 18 MG/1
TABLET ORAL
Qty: 14 TABLET | Refills: 0 | Status: SHIPPED | OUTPATIENT
Start: 2023-12-27 | End: 2024-01-11

## 2023-12-27 ASSESSMENT — PAIN SCALES - GENERAL: PAINLEVEL: NO PAIN (0)

## 2023-12-27 NOTE — NURSING NOTE
Is the patient currently in the state of [place]? YES    Visit mode:VIDEO    If the visit is dropped, the patient can be reconnected by: VIDEO VISIT: Send to e-mail at: [email or web address]    Will anyone else be joining the visit? Mom  (If patient encounters technical issues they should call [phone]    How would you like to obtain your AVS? MyChart    Are changes needed to the allergy or medication list? No    QNRS not done together due to time of check in    Reason for visit: RECHECK    Shelbi COTEF

## 2023-12-27 NOTE — PROGRESS NOTES
"Isak Drummond is a 28 year old who is being evaluated via video appointment.  He is accompanied by his mother.  Last appointment was 12/8/23.    Current meds: Concerta 54 q am with methylphenidate 10 mg q am and methylphenidate 10 mg q 430 pm, intuniv 3 mg q day.    Isak found that taking Concerta 54 mg with methylphenidate 10 mg was more effective than taking Concerta 54 mg alone.  However, he feels there is still room for improvement.  Taking methylphenidate 10 mg at 430 pm is helpful for homework and his job.    He reports that mood is good.  Energy is \"decent\".  His concentration is improved.  Appetite is fine.  He is sleeping OK.  Latency is 20-50 min but this is not related to taking afternoon stimulant or not.  This is no change from pre-stimulant.    Denies SEs from stimulant.  Initially Isak thought his hands were cold but that is not occurring.    He is taking the stimulant daily even though he is on vacation from school.    Isak would like to increase his stimulant dose in the am to 72 mg concerta.    SEs. Denies side effects from methylphenidate    Complianace is good.    MED ROS:  2 years ago: Serious knee (ligaments) and leg injury from fall while running.        VITALS (11/8/23 visit):  BP: 129/74, P: 85, Weight: 127#    MENTAL STATUS EXAMINATION:  Isak is casually dressed and groomed.  He maintains good eye contact.  He answers questions intelligently.  His mood is neutral.  Affect is full range and appropriate.  Thinking is linear and goal-directed, associations are intact.  There is no psychosis.  There is no suicidal ideation.  Isak is oriented.  His recent and remote memory, attention, concentration, language and fund of knowledge are all within normal limits.  No speech or motor abnormalities.  Insight and judgment are good.     DIAGNOSES:     1.  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  Mood disorder unspecified - in remission        RECOMMENDATIONS: "   1.  Continue Guanfacine ER 3 mg/day  2.  Increase Concerta to 72 mg q am  3.  Methylphenidate 10 mg at 430 pm for a booster dose.  5.  Send My Chart message in 10 days about response to new dose of stimulant and will adjust as indicated.  6. RTC: 1/24/23 at 330 pm for video appointment 30 min.        Type of service:  video  Time of service:  Date:  12/27/2023  Start Time:  305 pm        End Time:   337 pm        .Level of Medical Decision Making:   - At least 1 chronic problem that is not stable  - Engaged in prescription drug management during visit (discussed any medication benefits, side effects, alternatives, etc.)

## 2023-12-28 ENCOUNTER — TELEPHONE (OUTPATIENT)
Dept: PSYCHIATRY | Facility: CLINIC | Age: 28
End: 2023-12-28

## 2023-12-28 NOTE — TELEPHONE ENCOUNTER
Mary Sorenson MD [facility]Khadijah white, CONSTANTINO Lucio,  Please contact Isak and let him know that I sent a 14-day supply of Concerta 18 mg.  I want him to take one Concerta 18 gm in the am with one Concerta 54 mg in the am for 10 days and then send a My Chart note about the effectiveness of this dose and if he is having side effects.  After receiving the My Chart note, I will decide whether to continue this plan.    Thanks.  Mary    Writer sent a [employer] message with above information and request to connect after 10 days on effectiveness and side effects.

## 2024-01-10 ENCOUNTER — MYC MEDICAL ADVICE (OUTPATIENT)
Dept: PSYCHIATRY | Facility: CLINIC | Age: 29
End: 2024-01-10

## 2024-01-10 DIAGNOSIS — F90.0 ADHD (ATTENTION DEFICIT HYPERACTIVITY DISORDER), INATTENTIVE TYPE: ICD-10-CM

## 2024-01-11 NOTE — TELEPHONE ENCOUNTER
1/11/24 10:10am    Incoming call from mom: Following up on Pt's previous request for a refill. They are looking to have this filled ASAP. Pt would like a confirmation message via [employer] once the order has been sent to the pharmacy

## 2024-01-11 NOTE — TELEPHONE ENCOUNTER
Last seen: 12/27  RTC: 1/24  Cancel: none  No-show: none  Next appt: 1/24     Incoming refill from patient via [employer]     Medication requested: methylphenidate HCl ER, OSM, (CONCERTA) 18 MG CR tablet  Directions: 1 tab (18 mg) q am with one 54 mg tab q am for total morning dose of 72 mg Concerta  Qty: 30 tablet   Last refilled: 12/29 for 14 d/s per

## 2024-01-12 RX ORDER — METHYLPHENIDATE HYDROCHLORIDE 18 MG/1
TABLET ORAL
Qty: 30 TABLET | Refills: 0 | Status: SHIPPED | OUTPATIENT
Start: 2024-01-12 | End: 2024-01-26

## 2024-01-24 ENCOUNTER — VIRTUAL VISIT (OUTPATIENT)
Dept: PSYCHIATRY | Facility: CLINIC | Age: 29
End: 2024-01-24
Payer: MEDICAID

## 2024-01-24 DIAGNOSIS — F90.0 ADHD (ATTENTION DEFICIT HYPERACTIVITY DISORDER), INATTENTIVE TYPE: Primary | ICD-10-CM

## 2024-01-24 PROCEDURE — 99214 OFFICE O/P EST MOD 30 MIN: CPT | Mod: 95 | Performed by: PSYCHIATRY & NEUROLOGY

## 2024-01-24 NOTE — NURSING NOTE
Is the patient currently in the state of [place]? YES    Visit mode:VIDEO    If the visit is dropped, the patient can be reconnected by: VIDEO VISIT: Text to cell phone:   Telephone Information:   Mobile [phone]       Will anyone else be joining the visit? No  (If patient encounters technical issues they should call [phone])    How would you like to obtain your AVS? MyChart    Are changes needed to the allergy or medication list? No    Rooming Documentation: Assigned questionnaire(s) completed .    Reason for visit: RECHECK     TENZIN Pruett

## 2024-01-24 NOTE — PROGRESS NOTES
Virtual Visit Details    Type of service:  Video Visit   Video Start Time: 3:51 PM  Video End Time: 4:11 PM    Originating Location (pt. Location): Home    Distant Location (provider location):  On-site  Platform used for Video Visit: Mary Drummond is a 28 year old who is being evaluated via video appointment.  He is accompanied by his mother.  Last appointment was 12/27/24    Current meds: Concerta 72 mg q am and methylphenidate 10 mg q 430 pm, intuniv 3 mg q day.    Isak reports that increasing the Concerta to 72 mg q am has provided additional benefit.  It lasts to 430 pm.  Then he takes the 10 mg of methylphenidate which provides benefit for about 4 hours.    He denies insomnia, appetite suppression, tics and dry mouth with the stimulant.    He reports that he is able to concentrate more easily and that his attention is sustained.  His mother reports benefit since he is not getting up from his homework every 15 min.      He is taking chem with lab and pre-calc which are demanding classes.      Mood is good.  His concentration is improved.  Appetite is fine.  He is sleeping OK.      SEs. Denies side effects from methylphenidate    Complianace is good.    MED ROS:  2 years ago: Serious knee (ligaments) and leg injury from fall while running.        VITALS (11/8/23 visit):  BP: 129/74, P: 85, Weight: 127#    MENTAL STATUS EXAMINATION:  Isak is casually dressed and groomed.  He maintains good eye contact.  He answers questions intelligently.  His mood is neutral.  Affect is full range and appropriate.  Thinking is linear and goal-directed, associations are intact.  There is no psychosis.  There is no suicidal ideation.  Iask is oriented.  His recent and remote memory, attention, concentration, language and fund of knowledge are all within normal limits.  No speech or motor abnormalities.  Insight and judgment are good.     DIAGNOSES:     1.  ADHD, predominantly inattentive with good response to  Concerta    2.  Mood disorder unspecified - in remission        RECOMMENDATIONS:   1.  Continue Guanfacine ER 3 mg/day  2.  Continue Concerta 72 mg q am  3.  Methylphenidate 10 mg at 430 pm for a booster dose.  .  6. RTC 2 months : for video appointment 30 min.          .Level of Medical Decision Making:   - At least 1 chronic problem that is not stable  - Engaged in prescription drug management during visit (discussed any medication benefits, side effects, alternatives, etc.)

## 2024-01-26 ENCOUNTER — MYC MEDICAL ADVICE (OUTPATIENT)
Dept: PSYCHIATRY | Facility: CLINIC | Age: 29
End: 2024-01-26

## 2024-01-26 RX ORDER — GUANFACINE 3 MG/1
3 TABLET, EXTENDED RELEASE ORAL DAILY
Qty: 30 TABLET | Refills: 3 | Status: SHIPPED | OUTPATIENT
Start: 2024-03-01 | End: 2024-06-25

## 2024-01-26 RX ORDER — METHYLPHENIDATE HYDROCHLORIDE 54 MG/1
54 TABLET ORAL EVERY MORNING
Qty: 30 TABLET | Refills: 0 | Status: SHIPPED | OUTPATIENT
Start: 2024-02-29 | End: 2024-03-18

## 2024-01-26 RX ORDER — METHYLPHENIDATE HYDROCHLORIDE 18 MG/1
TABLET ORAL
Qty: 30 TABLET | Refills: 0 | Status: SHIPPED | OUTPATIENT
Start: 2024-03-08 | End: 2024-03-18

## 2024-01-26 RX ORDER — METHYLPHENIDATE HYDROCHLORIDE 10 MG/1
TABLET ORAL
Qty: 30 TABLET | Refills: 0 | Status: SHIPPED | OUTPATIENT
Start: 2024-01-26 | End: 2024-03-18

## 2024-01-26 RX ORDER — METHYLPHENIDATE HYDROCHLORIDE 18 MG/1
TABLET ORAL
Qty: 30 TABLET | Refills: 0 | Status: SHIPPED | OUTPATIENT
Start: 2024-02-09 | End: 2024-03-18

## 2024-01-26 RX ORDER — METHYLPHENIDATE HYDROCHLORIDE 10 MG/1
TABLET ORAL
Qty: 30 TABLET | Refills: 0 | Status: SHIPPED | OUTPATIENT
Start: 2024-02-23

## 2024-03-18 ENCOUNTER — VIRTUAL VISIT (OUTPATIENT)
Dept: PSYCHIATRY | Facility: CLINIC | Age: 29
End: 2024-03-18
Payer: COMMERCIAL

## 2024-03-18 DIAGNOSIS — F90.0 ADHD (ATTENTION DEFICIT HYPERACTIVITY DISORDER), INATTENTIVE TYPE: Primary | ICD-10-CM

## 2024-03-18 PROCEDURE — 99214 OFFICE O/P EST MOD 30 MIN: CPT | Mod: 95 | Performed by: PSYCHIATRY & NEUROLOGY

## 2024-03-18 RX ORDER — METHYLPHENIDATE HYDROCHLORIDE 10 MG/1
TABLET ORAL
Qty: 45 TABLET | Refills: 0 | Status: SHIPPED | OUTPATIENT
Start: 2024-03-18 | End: 2024-06-25

## 2024-03-18 RX ORDER — METHYLPHENIDATE HYDROCHLORIDE 54 MG/1
54 TABLET ORAL EVERY MORNING
Qty: 30 TABLET | Refills: 0 | Status: SHIPPED | OUTPATIENT
Start: 2024-04-05 | End: 2024-06-25

## 2024-03-18 RX ORDER — METHYLPHENIDATE HYDROCHLORIDE 54 MG/1
54 TABLET ORAL EVERY MORNING
Qty: 30 TABLET | Refills: 0 | Status: SHIPPED | OUTPATIENT
Start: 2024-05-03 | End: 2024-06-25

## 2024-03-18 RX ORDER — METHYLPHENIDATE HYDROCHLORIDE 18 MG/1
TABLET ORAL
Qty: 30 TABLET | Refills: 0 | Status: SHIPPED | OUTPATIENT
Start: 2024-05-13 | End: 2024-06-25

## 2024-03-18 RX ORDER — METHYLPHENIDATE HYDROCHLORIDE 18 MG/1
TABLET ORAL
Qty: 30 TABLET | Refills: 0 | Status: SHIPPED | OUTPATIENT
Start: 2024-04-15 | End: 2024-06-25

## 2024-03-18 ASSESSMENT — PAIN SCALES - GENERAL: PAINLEVEL: NO PAIN (0)

## 2024-03-18 NOTE — PROGRESS NOTES
"Virtual Visit Details  Originating Location (pt. Location): Home    Distant Location (provider location):  On-site  Platform used for Video Visit: Mary Drummond is a 28 year old who is being evaluated via video appointment.  He is accompanied by his mother.  Last appointment was 12/27/24    Current meds: Concerta 72 mg q am and methylphenidate 10 mg q 430 pm, intuniv 3 mg q day.    Isak reports that Concerta to 72 mg q am is working well.  It lasts to 430 pm.  Then he takes the 10 mg of methylphenidate which provides benefit for about 4 hours. Sometimes the 10 mg of methylphenidate seems more effective than other times.  Isak thinks that there is room for improvement.    He denies insomnia, appetite suppression, tics and dry mouth with the stimulant.        He is taking chem with lab and pre-calc which are demanding classes.  He is getting a C in both currently.    Mood is normal, fine. His concentration is improved.  The stimulant \"helps a lot\".  Appetite is fine.  He is sleeping OK.      SEs. Denies side effects from methylphenidate    Complianace is good.    MED ROS:  2 years ago: Serious knee (ligaments) and leg injury from fall while running.  He had superior tib/fib ligament repair and is unable to run now.      VITALS (11/8/23 visit):  BP: 129/74, P: 85, Weight: 127#    MENTAL STATUS EXAMINATION:  Isak is casually dressed and groomed.  He maintains good eye contact.  He answers questions intelligently.  His mood is neutral.  Affect is full range and appropriate.  Thinking is linear and goal-directed, associations are intact.  There is no psychosis.  There is no suicidal ideation.  Isak is oriented.  His recent and remote memory, attention, concentration, language and fund of knowledge are all within normal limits.  No speech or motor abnormalities.  Insight and judgment are good.     DIAGNOSES:     1.  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  Mood disorder " unspecified - in remission        RECOMMENDATIONS:   1.  Continue Guanfacine ER 3 mg/day  2.  Continue Concerta 72 mg q am  3.  Increase Methylphenidate 15 mg at 430 pm for a booster dose.  Send My Chart note in several weeks to confirm if new dose of methylphenidate is beneficial without side effects.  .  6. RTC June .  Video Start Time: 435 PM  Video End Time: 500 PM  Video 25 min   Documentation and writing prescriptions: 22 min  Total Time on 3/18/24: 47 min      .Level of Medical Decision Making:   - At least 1 chronic problem that is not stable  - Engaged in prescription drug management during visit (discussed any medication benefits, side effects, alternatives, etc.)

## 2024-03-18 NOTE — PROGRESS NOTES
Virtual Visit Details    Type of service:  Video Visit   Video Start Time:  435 pm  Video End Time: 500 pm    Originating Location (pt. Location): Home    Distant Location (provider location):  Off-site  Platform used for Video Visit: [employer]

## 2024-03-18 NOTE — NURSING NOTE
Is the patient currently in the state of [place]? YES    Visit mode:VIDEO    If the visit is dropped, the patient can be reconnected by: VIDEO VISIT:  Send e-mail to at [email or web address]    Will anyone else be joining the visit? No  (If patient encounters technical issues they should call [phone])    How would you like to obtain your AVS? MyChart    Are changes needed to the allergy or medication list? No    Rooming Documentation: Assigned questionnaire(s) completed .    Reason for visit: RECHTENZIN Combs

## 2024-06-11 DIAGNOSIS — F90.0 ADHD (ATTENTION DEFICIT HYPERACTIVITY DISORDER), INATTENTIVE TYPE: ICD-10-CM

## 2024-06-12 RX ORDER — GUANFACINE 3 MG/1
TABLET, EXTENDED RELEASE ORAL
Qty: 30 TABLET | Refills: 3 | OUTPATIENT
Start: 2024-06-12

## 2024-06-13 NOTE — TELEPHONE ENCOUNTER
"Date of Last Office Visit: 3/18/24  RTC: June  No shows: 0  Cancellations: 0  Date of Next Office Visit: 6/24/24  ------------------------------    Incoming refill from Pharmacy via interface  Medication requested:    guanFACINE HCl (INTUNIV) 3 MG TB24 24 hr tablet 30 tablet 3 3/1/2024 -- No   Sig - Route: Take 1 tablet (3 mg) by mouth daily   Last refill: 5/16/2024   ------------------------------  From last visit note:   \"   Continue Guanfacine ER 3 mg/day \"       Refill decision: Refused, Should have 1 refill left on file.                       "

## 2024-06-24 ENCOUNTER — OFFICE VISIT (OUTPATIENT)
Dept: PSYCHIATRY | Facility: CLINIC | Age: 29
End: 2024-06-24
Payer: COMMERCIAL

## 2024-06-24 VITALS
BODY MASS INDEX: 21.46 KG/M2 | HEIGHT: 65 IN | DIASTOLIC BLOOD PRESSURE: 67 MMHG | HEART RATE: 88 BPM | SYSTOLIC BLOOD PRESSURE: 116 MMHG | WEIGHT: 128.8 LBS

## 2024-06-24 DIAGNOSIS — F90.0 ADHD (ATTENTION DEFICIT HYPERACTIVITY DISORDER), INATTENTIVE TYPE: Primary | ICD-10-CM

## 2024-06-24 PROCEDURE — 99214 OFFICE O/P EST MOD 30 MIN: CPT | Mod: 95 | Performed by: PSYCHIATRY & NEUROLOGY

## 2024-06-24 NOTE — PATIENT INSTRUCTIONS
**For crisis resources, please see the information at the end of this document**   Patient Education    Thank you for coming to the [facility].    Lab Testing:  If you had lab testing today and your results are reassuring or normal they will be mailed to you or sent through [employer] within 7 days. If the lab tests need quick action we will call you with the results. The phone number we will call with results is # [phone] (home) . If this is not the best number please call our clinic and change the number.    Medication Refills:  If you need any refills please call your pharmacy and they will contact us. Our fax number for refills is [phone]. Please allow three business for refill processing. If you need to  your refill at a new pharmacy, please contact the new pharmacy directly. The new pharmacy will help you get your medications transferred.     Scheduling:  If you have any concerns about today's visit or wish to schedule another appointment please call our office during normal business hours [phone] (8-5:00 M-F)    Contact Us:  Please call [phone] during business hours (8-5:00 M-F).  If after clinic hours, or on the weekend, please call  [phone].    Financial Assistance [phone]  [facility] Billing [phone]  Central Billing Office, [facility]: [phone]  [facility] Billing [phone]  Medical Records [phone]  [facility] Patient Bill of Rights [email or web address]        MENTAL HEALTH CRISIS RESOURCES:  For a emergency help, please call 911 or go to the nearest Emergency Department.      Children's Emergency Walk-In Options:   [facility]:  [address], [place], [address]  [facility]:   [address], [address]  [place] - [address], [place], [place],  [address]    Adult Emergency Walk-In Options:  [facility]:  [facility] [place], [place], [place], [address]  [facility]:  [address] [place] [address]  [facility]:  [address], [place] [address]  [facility] :  [address] [place] [address]    [facility] Crisis Information:   Derek CALLAHAN) - Adult: [phone]       Child: [phone]  Ghulam - Adult: [phone]     Child: [phone]  [place]: [phone]  [place]: [phone]  [place]: [phone]    List of all [facility] resources:   https://[place].gov/dhs/people-we-serve/adults/health-care/mental-health/resources/crisis-contacts.jsp     National Crisis Information:   Call or text: '988'  National Suicide Prevention Lifeline: 2-231-529-TALK (1-500.714.8165) - for online chat options, visit https://suicidepreventionlifeline.org/chat/  Poison Control Center: 0-375-945-9367  Trans Lifeline: 2-778-221-1620 - Hotline for transgender people of all ages  The [employer] Project: 3-992-540-8738 - Hotline for LGBT youth      For Non-Emergency Support:   Fast Tracker: Mental Health & Substance Use Disorder Resources -   https://www.fasttrackermn.org/        Again thank you for choosing [facility] and please let us know how we can best partner with you to improve you and your family's health.    You may be receiving a survey regarding this appointment. We would love to have your feedback, both positive and negative. The survey is done by an external company, so your answers are anonymous.

## 2024-06-24 NOTE — LETTER
"6/24/2024       RE: Isak Drummond  [address] [place] [address]     Dear Colleague,    Thank you for referring your patient, Isak Drummond, to the [facility]. Please see a copy of my visit note below.    Virtual Visit Details  Originating Location (pt. Location): Home    Distant Location (provider location):  On-site  Platform used for Video Visit: Mary Drummond is a 29 year old who is being evaluated via video appointment.  He is accompanied by his mother.  Last appointment was 3/18/24    Current meds: Concerta 72 mg q am and methylphenidate 15 mg q 5 to 530 pm, intuniv 3 mg q day. Not taking booster dose on most days during the summer    Isak reports that Concerta to 72 mg q am is working well.  He got B- to C for his final 2 grades and was OK with these grades because they were very difficult classes.  He will be taking chem 2 and calculus in the fall at [employer].    He denies insomnia, appetite suppression, tics and dry mouth with the stimulant.    Mood is normal, fine. His concentration is improved.  The stimulant \"helps a lot\".  Appetite is fine.  He is sleeping OK.      He enjoys rowing and canoeing.    Family is pleased with the benefits of the current meds and do not want to make aniy changes.    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  I suggested an ADHD .    SEs. Denies side effects from methylphenidate    Complianace is good.    MED ROS:  2 years ago: Serious knee (ligaments) and leg injury from fall while running.  He had superior tib/fib ligament repair and is unable to run now.      VITALS (11/8/23 visit):  BP: 129/74, P: 85, Weight: 128.8#    EKG NL per mother    Enjoys canoeing and rowing.        MENTAL STATUS EXAMINATION:  Isak is casually dressed and groomed.  He maintains good eye contact.  He answers questions " intelligently.  His mood is neutral.  Affect is full range and appropriate.  Thinking is linear and goal-directed, associations are intact.  There is no psychosis.  There is no suicidal ideation.  Isak is oriented.  His recent and remote memory, attention, concentration, language and fund of knowledge are all within normal limits.  No speech or motor abnormalities.  Insight and judgment are good.     DIAGNOSES:     1.  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  Mood disorder unspecified - in remission        RECOMMENDATIONS:   1.  Continue Guanfacine ER 3 mg q am  2.  Continue Concerta 72 mg q am  3.  Methylphenidate 15 mg at 5 pm for a booster dose when Isak returns to college in the fall..  .  4. RTC 3 months    In person visit  Start Time: 115 PM  End Time: 158PM  Chart review: 5 min  Total Time on 6/24/24: 48 min      .Level of Medical Decision Making:   - At least 1 chronic problem that is not stable  - Engaged in prescription drug management during visit (discussed any medication benefits, side effects, alternatives, etc.)          Mary Sorenson MD

## 2024-06-24 NOTE — NURSING NOTE
"Chief Complaint   Patient presents with    Eval/Assessment       /67 (BP Location: Right arm, Patient Position: Sitting, Cuff Size: Adult Regular)   Pulse 88   Ht 1.657 m (5' 5.25\")   Wt 58.4 kg (128 lb 12.8 oz)   BMI 21.27 kg/m      Fredi Townsend  June 24, 2024   "

## 2024-06-24 NOTE — PROGRESS NOTES
"Virtual Visit Details  Originating Location (pt. Location): Home    Distant Location (provider location):  On-site  Platform used for Video Visit: Mary Drummond is a 29 year old who is being evaluated via video appointment.  He is accompanied by his mother.  Last appointment was 3/18/24    Current meds: Concerta 72 mg q am and methylphenidate 15 mg q 5 to 530 pm, intuniv 3 mg q day. Not taking booster dose on most days during the summer    Isak reports that Concerta to 72 mg q am is working well.  He got B- to C for his final 2 grades and was OK with these grades because they were very difficult classes.  He will be taking chem 2 and calculus in the fall at [employer].    He denies insomnia, appetite suppression, tics and dry mouth with the stimulant.    Mood is normal, fine. His concentration is improved.  The stimulant \"helps a lot\".  Appetite is fine.  He is sleeping OK.      He enjoys rowing and canoeing.    Family is pleased with the benefits of the current meds and do not want to make aniy changes.    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  I suggested an ADHD .    SEs. Denies side effects from methylphenidate    Complianace is good.    MED ROS:  2 years ago: Serious knee (ligaments) and leg injury from fall while running.  He had superior tib/fib ligament repair and is unable to run now.      VITALS (11/8/23 visit):  BP: 129/74, P: 85, Weight: 128.8#    EKG NL per mother    Enjoys canoeing and rowing.        MENTAL STATUS EXAMINATION:  Isak is casually dressed and groomed.  He maintains good eye contact.  He answers questions intelligently.  His mood is neutral.  Affect is full range and appropriate.  Thinking is linear and goal-directed, associations are intact.  There is no psychosis.  There is no suicidal ideation.  Isak is oriented.  His recent and remote memory, attention, concentration, language and fund of knowledge are all within " normal limits.  No speech or motor abnormalities.  Insight and judgment are good.     DIAGNOSES:     1.  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  Mood disorder unspecified - in remission        RECOMMENDATIONS:   1.  Continue Guanfacine ER 3 mg q am  2.  Continue Concerta 72 mg q am  3.  Methylphenidate 15 mg at 5 pm for a booster dose when Isak returns to college in the fall..  .  4. RTC 3 months    In person visit  Start Time: 115 PM  End Time: 158PM  Chart review: 5 min  Total Time on 6/24/24: 48 min      .Level of Medical Decision Making:   - At least 1 chronic problem that is not stable  - Engaged in prescription drug management during visit (discussed any medication benefits, side effects, alternatives, etc.)

## 2024-06-25 RX ORDER — GUANFACINE 3 MG/1
3 TABLET, EXTENDED RELEASE ORAL DAILY
Qty: 30 TABLET | Refills: 3 | Status: SHIPPED | OUTPATIENT
Start: 2024-06-25

## 2024-06-25 RX ORDER — METHYLPHENIDATE HYDROCHLORIDE 18 MG/1
TABLET ORAL
Qty: 30 TABLET | Refills: 0 | Status: SHIPPED | OUTPATIENT
Start: 2024-07-23 | End: 2024-10-02

## 2024-06-25 RX ORDER — METHYLPHENIDATE HYDROCHLORIDE 54 MG/1
54 TABLET ORAL EVERY MORNING
Qty: 30 TABLET | Refills: 0 | Status: SHIPPED | OUTPATIENT
Start: 2024-08-20 | End: 2024-10-02

## 2024-06-25 RX ORDER — METHYLPHENIDATE HYDROCHLORIDE 18 MG/1
TABLET ORAL
Qty: 30 TABLET | Refills: 0 | Status: SHIPPED | OUTPATIENT
Start: 2024-06-25 | End: 2024-10-02

## 2024-06-25 RX ORDER — METHYLPHENIDATE HYDROCHLORIDE 18 MG/1
TABLET ORAL
Qty: 30 TABLET | Refills: 0 | Status: SHIPPED | OUTPATIENT
Start: 2024-08-20 | End: 2024-10-02

## 2024-06-25 RX ORDER — METHYLPHENIDATE HYDROCHLORIDE 10 MG/1
TABLET ORAL
Qty: 45 TABLET | Refills: 0 | Status: SHIPPED | OUTPATIENT
Start: 2024-07-17 | End: 2024-10-02

## 2024-06-25 RX ORDER — METHYLPHENIDATE HYDROCHLORIDE 54 MG/1
54 TABLET ORAL EVERY MORNING
Qty: 30 TABLET | Refills: 0 | Status: SHIPPED | OUTPATIENT
Start: 2024-06-25 | End: 2024-10-02

## 2024-06-25 RX ORDER — METHYLPHENIDATE HYDROCHLORIDE 54 MG/1
54 TABLET ORAL EVERY MORNING
Qty: 30 TABLET | Refills: 0 | Status: SHIPPED | OUTPATIENT
Start: 2024-07-23 | End: 2024-10-02

## 2024-10-02 ENCOUNTER — VIRTUAL VISIT (OUTPATIENT)
Dept: PSYCHIATRY | Facility: CLINIC | Age: 29
End: 2024-10-02
Payer: COMMERCIAL

## 2024-10-02 DIAGNOSIS — F90.0 ADHD (ATTENTION DEFICIT HYPERACTIVITY DISORDER), INATTENTIVE TYPE: ICD-10-CM

## 2024-10-02 PROCEDURE — 99214 OFFICE O/P EST MOD 30 MIN: CPT | Mod: 95 | Performed by: PSYCHIATRY & NEUROLOGY

## 2024-10-02 ASSESSMENT — PAIN SCALES - GENERAL: PAINLEVEL: NO PAIN (0)

## 2024-10-02 NOTE — PROGRESS NOTES
Virtual Visit Details  Originating Location (pt. Location): Home    Distant Location (provider location):  On-site  Platform used for Video Visit: [email or web address]  Start: 330 pm    Isak Drummond is a 29 year old who is being evaluated via video appointment.   Last appointment was 6/24/24    Current meds: Concerta 72 mg q am and methylphenidate 15 mg q 5 to 530 pm, intuniv 3 mg q day.     Isak reports that Concerta to 72 mg q am is working fine.  He istaking 2 hard courses:   chem 2 and calculus at [employer].  So far, he is doing OK, but states that the classes are demanding.    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      He denies insomnia, appetite suppression, tics with the stimulant.    Mood is fine. His concentration is improved.   Appetite is fine.  He is sleeping OK.      He denies anxiety.    He enjoys rowing and canoeing and will be doing cross country ski skating.      SEs. Denies side effects from methylphenidate    Complianace is good.    MED ROS:  2 years ago: Serious knee (ligaments) and leg injury from fall while running.  He had superior tib/fib ligament repair and is unable to run now.      VITALS (11/8/23 visit):  BP: 129/74, P: 85, Weight: 128.8#    EKG NL per mother    Enjoys canoeing and rowing.        MENTAL STATUS EXAMINATION:  Isak is casually dressed and groomed.  He maintains good eye contact.  He answers questions intelligently.  His mood is neutral.  Affect is full range and appropriate.  Thinking is linear and goal-directed, associations are intact.  There is no psychosis.  There is no suicidal ideation.  Isak is oriented.  His recent and remote memory, attention, concentration, language and fund of knowledge are all within normal limits.  No speech or motor abnormalities.  Insight and judgment are good.     DIAGNOSES:     1.  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  Mood disorder unspecified - in  remission        RECOMMENDATIONS:   1.  Continue Guanfacine ER 3 mg q am  2.  Continue Concerta 72 mg q am  3.  Methylphenidate 15 mg at 5 pm for a booster dose when Isak returns to college in the fall..  .  4. RTC 3-4 months    videovisit  Start Time: 330 PM  End Time: 400 PM        .Level of Medical Decision Making:   - At least 1 chronic problem that is not stable  - Engaged in prescription drug management during visit (discussed any medication benefits, side effects, alternatives, etc.)                    Virtual Visit Details    Type of service:  Video Visit   Video Start Time:  330 pm  Video End Time:  400 pm  Originating Location (pt. Location): Home    Distant Location (provider location):  On-site  Platform used for Video Visit: Mary

## 2024-10-02 NOTE — NURSING NOTE
Is the patient currently in the state of [place]? YES    Current patient location: [address] [place] [address] [place] [address]    Visit mode:VIDEO    If the visit is dropped, the patient can be reconnected by: VIDEO VISIT: Text to cell phone:   Telephone Information:   Mobile [phone]       Will anyone else be joining the visit? No  (If patient encounters technical issues they should call [phone])    Are changes needed to the allergy or medication list? Yes Medications have duplicates. and Pt stated no changes to allergies    Are refills needed on medications prescribed by this physician? Yes    Rooming Documentation: Questionnaire(s) completed.    Reason for visit: RECHESTER Welch, VVF

## 2024-10-04 RX ORDER — METHYLPHENIDATE HYDROCHLORIDE 54 MG/1
54 TABLET ORAL EVERY MORNING
Qty: 30 TABLET | Refills: 0 | Status: SHIPPED | OUTPATIENT
Start: 2024-12-09

## 2024-10-04 RX ORDER — METHYLPHENIDATE HYDROCHLORIDE 54 MG/1
54 TABLET ORAL EVERY MORNING
Qty: 30 TABLET | Refills: 0 | Status: SHIPPED | OUTPATIENT
Start: 2024-10-14

## 2024-10-04 RX ORDER — METHYLPHENIDATE HYDROCHLORIDE 18 MG/1
TABLET ORAL
Qty: 30 TABLET | Refills: 0 | Status: SHIPPED | OUTPATIENT
Start: 2024-11-27

## 2024-10-04 RX ORDER — METHYLPHENIDATE HYDROCHLORIDE 54 MG/1
54 TABLET ORAL EVERY MORNING
Qty: 30 TABLET | Refills: 0 | Status: SHIPPED | OUTPATIENT
Start: 2024-11-11

## 2024-10-04 RX ORDER — METHYLPHENIDATE HYDROCHLORIDE 18 MG/1
TABLET ORAL
Qty: 30 TABLET | Refills: 0 | Status: SHIPPED | OUTPATIENT
Start: 2024-10-04

## 2024-10-04 RX ORDER — METHYLPHENIDATE HYDROCHLORIDE 18 MG/1
TABLET ORAL
Qty: 30 TABLET | Refills: 0 | Status: SHIPPED | OUTPATIENT
Start: 2024-10-30

## 2024-10-04 RX ORDER — METHYLPHENIDATE HYDROCHLORIDE 10 MG/1
TABLET ORAL
Qty: 45 TABLET | Refills: 0 | Status: SHIPPED | OUTPATIENT
Start: 2024-10-04

## 2024-10-17 DIAGNOSIS — F90.0 ADHD (ATTENTION DEFICIT HYPERACTIVITY DISORDER), INATTENTIVE TYPE: ICD-10-CM

## 2024-10-21 RX ORDER — GUANFACINE 3 MG/1
3 TABLET, EXTENDED RELEASE ORAL DAILY
Qty: 30 TABLET | Refills: 0 | Status: SHIPPED | OUTPATIENT
Start: 2024-10-21

## 2024-10-21 NOTE — TELEPHONE ENCOUNTER
"Date of Last Office Visit: 10/2/2024  [facility]      RTC: 3-4mo  No shows: 0  Cancellations: 0  Date of Next Office Visit: 0  ------------------------------    Medication requested:    guanFACINE HCl (INTUNIV) 3 MG TB24 24 hr tablet 30 tablet 3 6/25/2024 -- No   Sig - Route: Take 1 tablet (3 mg) by mouth daily - Oral     ------------------------------  From last visit note:   \"Continue Guanfacine ER 3 mg q am \"     Refill Decision:    [x]Medication refilled per  Medication Refill in Ambulatory Care  policy.         [x] Supervision: no future appointment scheduled. Scheduling has been notified to contact the pt for appointment.    []Medication unable to be refilled by RN due to criteria not met as indicated below:          [] Eligibility - Pt not seen within past 12 months, no future appointment       [] Supervision: no future appointment scheduled. Scheduling has been notified to contact the pt for appointment.       [] Compliance - lapse in therapy/gap in refills; No Shows; Cancellations       [] Verification - order discrepancy, clarification needed, modification needed       [] Clementina refills given. Pt did not follow-up, pended to care team for decision       [] Controlled medication       [] Medication not included in refill protocol policy       [] Medication is Reported/Historical       [] Medication not active on Pt's med list       [] > 30-day supply request       [] Advanced refill request: > 7 days before refill date       [] Review is needed: new med; med adjusted ? 30 days; Safety Alert; requires lab monitoring       [] Last visit treatment plan \"Open/Pended/Unsigned\" - routing for review.       [] OTHER:                        "

## 2024-11-22 ENCOUNTER — MYC REFILL (OUTPATIENT)
Dept: PSYCHIATRY | Facility: CLINIC | Age: 29
End: 2024-11-22

## 2024-11-22 DIAGNOSIS — F90.0 ADHD (ATTENTION DEFICIT HYPERACTIVITY DISORDER), INATTENTIVE TYPE: ICD-10-CM

## 2024-11-22 NOTE — TELEPHONE ENCOUNTER
Last seen: 10/2/24  RTC: 3-4 months.   Cancel: 0  No-show: 0  Next appt: 0    Medication requested:   Pending Prescriptions:                       Disp   Refills    methylphenidate (RITALIN) 10 MG tablet    45 tab*0            Sig: Take 1.5 tabs (15 mg) daily at 5 pm    From chart note:   - Methylphenidate 15 mg at 5 pm for a booster dose when Isak returns to college in the fall.      Last refill per :          Medication unable to be refilled by RN due to criteria not met as indicated.                 []Eligibility - not seen in the last year              []Supervision - no future appointment              []Compliance - no shows, cancellations or lapse in therapy              []Verification - order discrepancy              [x]Controlled medication              []Medication not included in policy              []90-day supply request              []Other:      EVAN Cavazos RN

## 2024-11-23 RX ORDER — METHYLPHENIDATE HYDROCHLORIDE 10 MG/1
TABLET ORAL
Qty: 45 TABLET | Refills: 0 | Status: SHIPPED | OUTPATIENT
Start: 2024-11-23

## 2024-12-19 DIAGNOSIS — F90.0 ADHD (ATTENTION DEFICIT HYPERACTIVITY DISORDER), INATTENTIVE TYPE: ICD-10-CM

## 2024-12-23 ENCOUNTER — MYC REFILL (OUTPATIENT)
Dept: PSYCHIATRY | Facility: CLINIC | Age: 29
End: 2024-12-23

## 2024-12-23 DIAGNOSIS — F90.0 ADHD (ATTENTION DEFICIT HYPERACTIVITY DISORDER), INATTENTIVE TYPE: ICD-10-CM

## 2024-12-23 RX ORDER — METHYLPHENIDATE HYDROCHLORIDE 54 MG/1
54 TABLET ORAL EVERY MORNING
Qty: 30 TABLET | Refills: 0 | Status: CANCELLED | OUTPATIENT
Start: 2024-12-23

## 2024-12-23 RX ORDER — GUANFACINE 3 MG/1
3 TABLET, EXTENDED RELEASE ORAL EVERY MORNING
Qty: 30 TABLET | Refills: 0 | Status: CANCELLED | OUTPATIENT
Start: 2024-12-23

## 2024-12-24 RX ORDER — GUANFACINE 3 MG/1
3 TABLET, EXTENDED RELEASE ORAL EVERY MORNING
Qty: 30 TABLET | Refills: 0 | Status: SHIPPED | OUTPATIENT
Start: 2024-12-24 | End: 2025-01-20

## 2024-12-24 RX ORDER — METHYLPHENIDATE HYDROCHLORIDE 10 MG/1
TABLET ORAL
Qty: 45 TABLET | Refills: 0 | Status: SHIPPED | OUTPATIENT
Start: 2024-12-24

## 2024-12-24 NOTE — TELEPHONE ENCOUNTER
Last seen: 10/2/24  RTC: 3-4 months.   Cancel: 0  No-show: 0  Next appt: 0    Last filled:   - Guanfacine: 11/19 for a 30 day supply.     Incoming refill from patient via mychart by patient or caregiver    Medication requested:   Pending Prescriptions:                       Disp   Refills    methylphenidate HCl ER (OSM) (CONCERTA) 5*30 tab*0            Sig: Take 1 tablet (54 mg) by mouth every morning.    guanFACINE HCl (INTUNIV) 3 MG TB24 24 hr *30 tab*0            Sig: Take 1 tablet (3 mg) by mouth every morning.    methylphenidate (RITALIN) 10 MG tablet    45 tab*0            Sig: Take 1.5 tabs (15 mg) daily at 5 pm    Last refill per       From chart note:   1.  Continue Guanfacine ER 3 mg q am  2.  Continue Concerta 72 mg q am  3.  Methylphenidate 15 mg at 5 pm for a booster dose when Isak returns to college in the fall.     Medication unable to be refilled by RN due to criteria not met as indicated.                 []Eligibility - not seen in the last year              []Supervision - no future appointment              []Compliance - no shows, cancellations or lapse in therapy              []Verification - order discrepancy              [x]Controlled medication              []Medication not included in policy              []90-day supply request              []Other:      FOLLOW UP:     Guanfacine: DENIED as a future dated script was written for today.   Concerta 54 MG: DENIED as patients pharmacy should have additional refills available.   Ritalin 10 MG: Sent to provider for review.     EVAN Cavazos RN

## 2024-12-24 NOTE — TELEPHONE ENCOUNTER
"Date of Last Office Visit: 10/2/2024  [facility]    Mary Sorenson MD  Psychiatry    RTC: 3-4 months  No shows: 0  Cancellations: 0  Date of Next Office Visit: none  ------------------------------    Medication requested:     Disp Refills Start End NANI   guanFACINE HCl (INTUNIV) 3 MG TB24 24 hr tablet 30 tablet 0 11/19/2024 -- No   Sig - Route: Take 1 tablet (3 mg) by mouth every morning. - Oral     ------------------------------  From last visit note:   \"  Continue Guanfacine ER 3 mg q am \"     Refill Decision:    [x]Medication refilled per  Medication Refill in Ambulatory Care  policy.         [x] Supervision: no future appointment scheduled. Scheduling has been notified to contact the pt for appointment.    []Medication unable to be refilled by RN due to criteria not met as indicated below:          [] Eligibility - Pt not seen within past 12 months, no future appointment       [] Supervision: no future appointment scheduled. Scheduling has been notified to contact the pt for appointment.       [] Compliance - lapse in therapy/gap in refills; No Shows; Cancellations       [] Verification - order discrepancy, clarification needed, modification needed       [] Clementina refills given. Pt did not follow-up, pended to care team for decision       [] Controlled medication       [] Medication not included in refill protocol policy       [] Medication is Reported/Historical       [] Medication not active on Pt's med list       [] > 30-day supply request       [] Advanced refill request: > 7 days before refill date       [] Review is needed: new med; med adjusted <= 30 days; Safety Alert; requires lab monitoring       [] Last visit treatment plan \"Open/Pended/Unsigned\" - routing for review.       [] OTHER:                        "

## 2024-12-29 ENCOUNTER — HEALTH MAINTENANCE LETTER (OUTPATIENT)
Age: 29
End: 2024-12-29

## 2025-01-19 DIAGNOSIS — F90.0 ADHD (ATTENTION DEFICIT HYPERACTIVITY DISORDER), INATTENTIVE TYPE: ICD-10-CM

## 2025-01-20 RX ORDER — GUANFACINE 3 MG/1
3 TABLET, EXTENDED RELEASE ORAL EVERY MORNING
Qty: 30 TABLET | Refills: 0 | Status: SHIPPED | OUTPATIENT
Start: 2025-01-20

## 2025-01-20 NOTE — TELEPHONE ENCOUNTER
"Date of Last Office Visit: 10/2/24  RTC: 3-4 months  No shows: 0  Cancellations: 0  Date of Next Office Visit: 0  ------------------------------    Medication requested:    guanFACINE HCl (INTUNIV) 3 MG TB24 24 hr tablet 30 tablet 0 12/24/2024     ------------------------------  From last visit note:   \"Continue Guanfacine ER 3 mg q am \"    Lapse in medication adherence greater than 5 days?:  no  Medication refill request verified as identical to current order?: yes    Refill Decision:  30 day randal refill sent to the pharmacy - including instructions for patient to call the clinic and schedule an appointment.      [x]Medication refilled per  Medication Refill in Ambulatory Care  policy.         [x] Supervision: no future appointment scheduled. Scheduling has been notified to contact the pt for appointment.    []Medication unable to be refilled by RN due to criteria not met as indicated below:          [] Eligibility - Pt not seen within past 12 months, no future appointment       [] Supervision: no future appointment scheduled. Scheduling has been notified to contact the pt for appointment.       [] Compliance - lapse in therapy/gap in refills; No Shows; Cancellations       [] Verification - order discrepancy, clarification needed, modification needed       [] Randal refills given. Pt did not follow-up, pended to care team for decision       [] Controlled medication       [] Medication not included in refill protocol policy       [] Medication is Reported/Historical       [] Medication not active on Pt's med list       [] > 30-day supply request       [] Advanced refill request: > 7 days before refill date       [] Review is needed: new med; med adjusted <= 30 days; Safety Alert; requires lab monitoring       [] Last visit treatment plan \"Open/Pended/Unsigned\" - routing for review.       [] OTHER:                         "

## 2025-01-29 ENCOUNTER — MYC REFILL (OUTPATIENT)
Dept: PSYCHIATRY | Facility: CLINIC | Age: 30
End: 2025-01-29

## 2025-01-29 DIAGNOSIS — F90.0 ADHD (ATTENTION DEFICIT HYPERACTIVITY DISORDER), INATTENTIVE TYPE: ICD-10-CM

## 2025-01-29 RX ORDER — METHYLPHENIDATE HYDROCHLORIDE 18 MG/1
TABLET ORAL
Qty: 30 TABLET | Refills: 0 | Status: SHIPPED | OUTPATIENT
Start: 2025-01-29

## 2025-01-29 RX ORDER — METHYLPHENIDATE HYDROCHLORIDE 54 MG/1
54 TABLET ORAL EVERY MORNING
Qty: 30 TABLET | Refills: 0 | Status: SHIPPED | OUTPATIENT
Start: 2025-01-29

## 2025-01-29 NOTE — TELEPHONE ENCOUNTER
Last seen: 10/2  RTC: 3-4 months   Cancel: none  No-show: none  Next appt: none scheduled      Incoming refill from patient via eSnipshart      methylphenidate HCl ER, OSM, (CONCERTA) 18 MG CR tablet 30 tablet 0 11/27/2024 -- No   Sig: Take one tab (18 mg) q am with one 54 mg tab q am for total morning dose of 72 mg Concerta   Last refilled: 12/23 #30 per     methylphenidate HCl ER, OSM, (CONCERTA) 54 MG CR tablet 30 tablet 0 12/9/2024 -- No   Sig - Route: Take 1 tablet (54 mg) by mouth every morning. - Oral   Last refilled: 12/24 #30 per

## 2025-02-15 DIAGNOSIS — F90.0 ADHD (ATTENTION DEFICIT HYPERACTIVITY DISORDER), INATTENTIVE TYPE: ICD-10-CM

## 2025-02-17 RX ORDER — GUANFACINE 3 MG/1
3 TABLET, EXTENDED RELEASE ORAL EVERY MORNING
Qty: 30 TABLET | Refills: 0 | Status: SHIPPED | OUTPATIENT
Start: 2025-02-17

## 2025-02-17 NOTE — TELEPHONE ENCOUNTER
"Date of Last Office Visit: 10/2/2024  [facility]      RTC: 3-4mo  No shows: 0  Cancellations: 0  Date of Next Office Visit: 0  ------------------------------    Last Script Details::     Disp Refills Start End NANI   guanFACINE HCl (INTUNIV) 3 MG TB24 24 hr tablet 30 tablet 0 1/20/2025 -- No   Sig - Route: Take 1 tablet (3 mg) by mouth every morning. For more refills,schedule an appointment at [phone] - Oral     ------------------------------  From last visit note:   \"Continue Guanfacine ER 3 mg q am \"    Lapse in medication adherence greater than 3 days?:  No    Refill Decision:    [x]Medication refilled per  Medication Refill in Ambulatory Care  policy.         [x] Supervision: no future appointment scheduled. Scheduling has been notified to contact the pt for appointment.       Request from pharmacy:  Requested Prescriptions   Pending Prescriptions Disp Refills    guanFACINE HCl (INTUNIV) 3 MG TB24 24 hr tablet [Pharmacy Med Name: GUANFACINE ER 3MG TABLETS] 30 tablet 0     Sig: TAKE 1 TABLET BY MOUTH EVERY MORNING       Attention Deficit/Hyperactivity Disorder (ADHD) Agents Protocol Failed - 2/17/2025 12:54 PM        Failed - RN Chart Review: If there has been a dose change or started on the medication since the previous refill, do not authorize. Send refill request to the provider.     Please review patient refills to confirm   This refill request isn't the 1st refill following initial prescription   OR   This refill request is not the 1st refill following a dose change.  If either of the above 2 are TRUE, patient must have had a recent visit within the past 30 days with the authorizing provider or a provider within his/her clinic AND specialty.           Passed - Most recent blood pressure under 140/90 in past 12 months     BP Readings from Last 3 Encounters:   06/24/24 116/67   11/08/23 129/74   05/23/19 125/81       No data recorded            Passed - Patient is age 6 or " older        Passed - Medication is active on med list and the sig matches. RN to manually verify dose and sig if red X/fail.     If the protocol passes (green check), you do not need to verify med dose and sig.    A prescription matches if they are the same clinical intention.    For Example: once daily and every morning are the same.    For all fails (red x), verify dose and sig.    If the refill does match what is on file, the RN can still proceed to approve the refill request.     If they do not match, route to the appropriate provider.             Passed - Recent (6 mo) or future (90 days)  visit within the authorizing provider's specialty     The patient must have completed an in-person or virtual visit within the past 6 months or has a future visit scheduled within the next 90 days with the authorizing provider s specialty.  Urgent care and e-visits do not quality as an office visit for this protocol.          Passed - Medicationindicatedfor associated diagnosis     The medication is prescribed for one or more of the following conditions:    Attention deficit hyperactive disorder   Attention deficit hyperactive disorder - Social phobia

## 2025-04-02 ENCOUNTER — VIRTUAL VISIT (OUTPATIENT)
Dept: PSYCHIATRY | Facility: CLINIC | Age: 30
End: 2025-04-02
Payer: COMMERCIAL

## 2025-04-02 VITALS — HEIGHT: 65 IN | WEIGHT: 127 LBS | BODY MASS INDEX: 21.16 KG/M2

## 2025-04-02 DIAGNOSIS — F90.0 ADHD (ATTENTION DEFICIT HYPERACTIVITY DISORDER), INATTENTIVE TYPE: ICD-10-CM

## 2025-04-02 PROCEDURE — 1126F AMNT PAIN NOTED NONE PRSNT: CPT | Mod: 95 | Performed by: PSYCHIATRY & NEUROLOGY

## 2025-04-02 PROCEDURE — G2211 COMPLEX E/M VISIT ADD ON: HCPCS | Mod: 95 | Performed by: PSYCHIATRY & NEUROLOGY

## 2025-04-02 PROCEDURE — 98006 SYNCH AUDIO-VIDEO EST MOD 30: CPT | Performed by: PSYCHIATRY & NEUROLOGY

## 2025-04-02 RX ORDER — METHYLPHENIDATE HYDROCHLORIDE 10 MG/1
TABLET ORAL
Qty: 30 TABLET | Refills: 0 | Status: CANCELLED | OUTPATIENT
Start: 2025-04-02

## 2025-04-02 ASSESSMENT — PAIN SCALES - GENERAL: PAINLEVEL_OUTOF10: NO PAIN (0)

## 2025-04-02 NOTE — NURSING NOTE
Current patient location: [address] [place] [address]  [place] [address]    Is the patient currently in the state of [place]? YES    Visit mode: VIDEO    If the visit is dropped, the patient can be reconnected by:VIDEO VISIT: Send to e-mail at: [email or web address]    Will anyone else be joining the visit? Mom  (If patient encounters technical issues they should call [phone]    Are changes needed to the allergy or medication list? No    Are refills needed on medications prescribed by this physician? YES    Rooming Documentation:  Questionnaire(s) completed    Reason for visit: RECHECK    Shelbi COTEF

## 2025-04-02 NOTE — PROGRESS NOTES
Virtual Visit Details    Type of service:  Video Visit   Video Start Time: 4:40 PM  Video End Time: 503 PM    Originating Location (pt. Location): Home    Distant Location (provider location):  On-site  Platform used for Video Visit: Mary Drummond is a 29 year old who is being evaluated via video appointment.   Last appointment was 4/2/25    Current meds: Concerta 72 mg q am and methylphenidate 15 mg q 5 to 530 pm, intuniv 3 mg q day.     Isak reports that Concerta to 72 mg q am is working fine.  He is taking 2 challenging classes including 2nd semester of chem and Calc 1.  Finals are the 2nd week of May.    Stressors: calc class, looking for an internship    Mood is fine. His concentration is good with Concerta.   Appetite is fine.  He is sleeping OK.  He goes to bed at [place] to 2 am and wakes up between 930-1030 am.  He denies SI.    He denies anxiety.    He enjoys  cross country skiing.      SEs. Denies side effects from methylphenidate    Complianace is good.    MED ROS:  2 years ago: Serious knee (ligaments) and leg injury from fall while running.  He had superior tib/fib ligament repair     MENTAL STATUS EXAMINATION:  Isak is casually dressed and groomed.  He maintains good eye contact.  He answers questions intelligently.  His mood is neutral.  Affect is full range and appropriate.  Thinking is linear and goal-directed, associations are intact.  There is no psychosis.  There is no suicidal ideation.  Isak is oriented.  His recent and remote memory, attention, concentration, language and fund of knowledge are all within normal limits.  No speech or motor abnormalities.  Insight and judgment are good.     DIAGNOSES:     1.  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  Mood disorder unspecified - in remission        RECOMMENDATIONS:   1.  Continue Guanfacine ER 3 mg q am  2.  Continue Concerta 72 mg q am  3.  Methylphenidate 15 mg at 5 pm for a booster dose PRN  4. RTC 3-4  months        .Level of Medical Decision Making:   - At least 1 chronic problem that is not stable  - Engaged in prescription drug management during visit (discussed any medication benefits, side effects, alternatives, etc.)      The longitudinal plan of care for the diagnosis(es)/condition(s) as documented were addressed during this visit. Due to the added complexity in care, I will continue to support Isak in the subsequent management and with ongoing continuity of care.

## 2025-04-07 RX ORDER — METHYLPHENIDATE HYDROCHLORIDE 54 MG/1
54 TABLET ORAL EVERY MORNING
Qty: 30 TABLET | Refills: 0 | Status: SHIPPED | OUTPATIENT
Start: 2025-04-07

## 2025-04-07 RX ORDER — METHYLPHENIDATE HYDROCHLORIDE 18 MG/1
TABLET ORAL
Qty: 30 TABLET | Refills: 0 | Status: SHIPPED | OUTPATIENT
Start: 2025-05-02

## 2025-04-07 RX ORDER — METHYLPHENIDATE HYDROCHLORIDE 54 MG/1
54 TABLET ORAL EVERY MORNING
Qty: 30 TABLET | Refills: 0 | Status: SHIPPED | OUTPATIENT
Start: 2025-06-02

## 2025-04-07 RX ORDER — GUANFACINE 3 MG/1
3 TABLET, EXTENDED RELEASE ORAL EVERY MORNING
Qty: 90 TABLET | Refills: 1 | Status: SHIPPED | OUTPATIENT
Start: 2025-04-07

## 2025-04-07 RX ORDER — METHYLPHENIDATE HYDROCHLORIDE 54 MG/1
54 TABLET ORAL EVERY MORNING
Qty: 30 TABLET | Refills: 0 | Status: SHIPPED | OUTPATIENT
Start: 2025-05-05

## 2025-04-07 RX ORDER — METHYLPHENIDATE HYDROCHLORIDE 18 MG/1
TABLET ORAL
Qty: 30 TABLET | Refills: 0 | Status: SHIPPED | OUTPATIENT
Start: 2025-06-27

## 2025-04-07 RX ORDER — METHYLPHENIDATE HYDROCHLORIDE 18 MG/1
TABLET ORAL
Qty: 30 TABLET | Refills: 0 | Status: SHIPPED | OUTPATIENT
Start: 2025-05-30